# Patient Record
Sex: MALE | Race: WHITE | Employment: PART TIME | URBAN - METROPOLITAN AREA
[De-identification: names, ages, dates, MRNs, and addresses within clinical notes are randomized per-mention and may not be internally consistent; named-entity substitution may affect disease eponyms.]

---

## 2018-07-17 LAB — HBA1C MFR BLD: 5.3 % (ref 4–6)

## 2018-07-18 LAB
ANION GAP SERPL CALCULATED.3IONS-SCNC: 11 MMOL/L (ref 10–20)
BASOPHILS # BLD: 0.9 % (ref 0–1.3)
BASOPHILS ABSOLUTE: 0.07 10*3/UL (ref 0.01–0.09)
BICARBONATE: 26 MMOL/L (ref 21–32)
BUN / CREAT RATIO: 14 (ref 5–25)
CALCIUM SERPL-MCNC: 9 MG/DL (ref 8.6–10.3)
CHLORIDE BLD-SCNC: 106 MMOL/L (ref 98–107)
CHOLESTEROL/HDL RATIO: 3.8
CHOLESTEROL: 219 MG/DL
CREAT SERPL-MCNC: 0.93 MG/DL (ref 0.5–1.3)
EOSINOPHIL # BLD: 5 % (ref 0–6.7)
EOSINOPHILS ABSOLUTE: 0.38 10*3/UL (ref 0.01–0.46)
ERYTHROCYTE [DISTWIDTH] IN BLOOD BY AUTOMATED COUNT: 12.6 % (ref 12–15.4)
ERYTHROCYTE [DISTWIDTH] IN BLOOD BY AUTOMATED COUNT: 39.8 FL (ref 39.3–48.6)
GFR CALCULATED: >60
GLUCOSE: 92 MG/DL (ref 70–100)
HCT VFR BLD CALC: 40.2 % (ref 38.4–54.9)
HDLC SERPL-MCNC: 58 MG/DL
HEMOGLOBIN: 13.7 G/DL (ref 12.8–17.7)
IMMATURE GRANS (ABS): 0.02 10*3/UL (ref 0–0.21)
IMMATURE GRANULOCYTES: 0.3 %
LDL CHOLESTEROL CALCULATED: 145 MG/DL
LYMPHOCYTES # BLD: 23.9 % (ref 9.4–41.1)
LYMPHOCYTES ABSOLUTE: 1.81 10*3/UL (ref 0.4–2.84)
MCH RBC QN AUTO: 29.7 PG (ref 27.5–32.9)
MCHC RBC AUTO-ENTMCNC: 34.1 G/DL (ref 30.5–35.4)
MCV RBC AUTO: 87 FL (ref 83.3–98.2)
MONOCYTES # BLD: 6.2 % (ref 3–16.2)
MONOCYTES ABSOLUTE: 0.47 10*3/UL (ref 0.25–1.33)
NEUTROPHILS ABSOLUTE: 4.83 10*3/UL (ref 2.22–7.53)
NEUTROPHILS: 63.7 % (ref 46.2–79.1)
NUCLEATED RBCS: 0 /100{WBCS}
PLATELET # BLD: 200 10*3/UL (ref 155–404)
PMV BLD AUTO: 11.1 FL (ref 9.9–12.1)
POTASSIUM SERPL-SCNC: 3.8 MMOL/L (ref 3.5–5.1)
RBC: 4.62 10*6/UL (ref 4.08–6.37)
RBCS COUNTED: 0 10*3/UL
SODIUM BLD-SCNC: 139 MMOL/L (ref 136–145)
TRIGL SERPL-MCNC: 82 MG/DL
UREA NITROGEN: 13 MG/DL (ref 6–23)
VLDLC SERPL CALC-MCNC: 16 MG/DL
WBC: 7.6 10*3/UL (ref 4.2–11)

## 2018-11-12 ENCOUNTER — HOSPITAL ENCOUNTER (OUTPATIENT)
Dept: SLEEP CENTER | Age: 48
Discharge: HOME | End: 2018-11-14
Payer: COMMERCIAL

## 2018-11-12 PROCEDURE — 95810 POLYSOM 6/> YRS 4/> PARAM: CPT

## 2018-11-12 PROCEDURE — 95810 POLYSOM 6/> YRS 4/> PARAM: CPT | Performed by: INTERNAL MEDICINE

## 2019-02-15 ENCOUNTER — HOSPITAL ENCOUNTER (OUTPATIENT)
Dept: SLEEP CENTER | Age: 49
Discharge: HOME | End: 2019-02-17
Payer: COMMERCIAL

## 2019-02-15 PROCEDURE — 95811 POLYSOM 6/>YRS CPAP 4/> PARM: CPT

## 2019-02-15 PROCEDURE — 95811 POLYSOM 6/>YRS CPAP 4/> PARM: CPT | Performed by: INTERNAL MEDICINE

## 2019-03-29 ENCOUNTER — HOSPITAL ENCOUNTER (OUTPATIENT)
Dept: CARDIAC CATH/INVASIVE PROCEDURES | Age: 49
Discharge: HOME | End: 2019-03-29
Attending: INTERNAL MEDICINE | Admitting: INTERNAL MEDICINE
Payer: COMMERCIAL

## 2019-03-29 VITALS
TEMPERATURE: 97.4 F | WEIGHT: 214 LBS | SYSTOLIC BLOOD PRESSURE: 124 MMHG | BODY MASS INDEX: 28.99 KG/M2 | DIASTOLIC BLOOD PRESSURE: 78 MMHG | RESPIRATION RATE: 19 BRPM | HEIGHT: 72 IN | HEART RATE: 55 BPM

## 2019-03-29 PROBLEM — I63.9 STROKE (CEREBRUM) (HCC): Status: ACTIVE | Noted: 2019-03-29

## 2019-03-29 PROCEDURE — 93312 ECHO TRANSESOPHAGEAL: CPT

## 2019-03-29 PROCEDURE — 93325 DOPPLER ECHO COLOR FLOW MAPG: CPT

## 2019-03-29 PROCEDURE — 2580000003 HC RX 258: Performed by: INTERNAL MEDICINE

## 2019-03-29 PROCEDURE — 93321 DOPPLER ECHO F-UP/LMTD STD: CPT

## 2019-03-29 RX ORDER — FENTANYL CITRATE 50 UG/ML
100 INJECTION, SOLUTION INTRAMUSCULAR; INTRAVENOUS ONCE
Status: DISCONTINUED | OUTPATIENT
Start: 2019-03-29 | End: 2019-03-29 | Stop reason: HOSPADM

## 2019-03-29 RX ORDER — CLOPIDOGREL BISULFATE 75 MG/1
75 TABLET ORAL DAILY
COMMUNITY
End: 2021-04-27 | Stop reason: ALTCHOICE

## 2019-03-29 RX ORDER — SODIUM CHLORIDE 0.9 % (FLUSH) 0.9 %
10 SYRINGE (ML) INJECTION PRN
Status: DISCONTINUED | OUTPATIENT
Start: 2019-03-29 | End: 2019-03-29 | Stop reason: HOSPADM

## 2019-03-29 RX ORDER — CHOLECALCIFEROL (VITAMIN D3) 1250 MCG
50000 CAPSULE ORAL WEEKLY
COMMUNITY
End: 2021-04-27

## 2019-03-29 RX ORDER — MIDAZOLAM HYDROCHLORIDE 1 MG/ML
4 INJECTION INTRAMUSCULAR; INTRAVENOUS ONCE
Status: DISCONTINUED | OUTPATIENT
Start: 2019-03-29 | End: 2019-03-29 | Stop reason: HOSPADM

## 2019-03-29 RX ORDER — SERTRALINE HYDROCHLORIDE 25 MG/1
25 TABLET, FILM COATED ORAL DAILY
COMMUNITY
End: 2020-01-03 | Stop reason: ALTCHOICE

## 2019-03-29 RX ORDER — SODIUM CHLORIDE 9 MG/ML
INJECTION, SOLUTION INTRAVENOUS CONTINUOUS
Status: DISCONTINUED | OUTPATIENT
Start: 2019-03-29 | End: 2019-03-29 | Stop reason: HOSPADM

## 2019-03-29 RX ORDER — BACTERIOSTATIC SODIUM CHLORIDE 0.9 %
10 VIAL (ML) INJECTION ONCE
Status: DISCONTINUED | OUTPATIENT
Start: 2019-03-29 | End: 2019-03-29 | Stop reason: HOSPADM

## 2019-03-29 RX ORDER — FLUTICASONE FUROATE AND VILANTEROL 100; 25 UG/1; UG/1
1 POWDER RESPIRATORY (INHALATION) DAILY
COMMUNITY
End: 2020-01-03

## 2019-03-29 RX ORDER — ASPIRIN 81 MG/1
81 TABLET, CHEWABLE ORAL DAILY
Status: ON HOLD | COMMUNITY
End: 2020-02-24 | Stop reason: SDUPTHER

## 2019-03-29 RX ORDER — ALPRAZOLAM 0.5 MG/1
0.5 TABLET ORAL EVERY 4 HOURS PRN
COMMUNITY

## 2019-03-29 RX ADMIN — SODIUM CHLORIDE 1000 ML: 9 INJECTION, SOLUTION INTRAVENOUS at 07:59

## 2019-03-29 NOTE — OP NOTE
Clinical indication:  Transesophageal echocardiogram is being performed to look for cardioembolic source      Procedure:  Informed consent was obtained. Pros and cons of the procedure, indications, the very rare possibility of esophageal perforation and minor complications that are likely were discussed, informed consent was obtained. Throat was anesthetized using Cetacaine spray. Heart rate blood pressure oxygen saturation and respiratory status was monitored throughout. IV conscious sedation was maintained using Versed and Demerol. The Omniplane CARINA probe was advanced atraumatically into the distal esophagus and proximal stomach and pictures were obtained in multiple views. There were no immediate complications. 2-D Doppler and Color imaging revealed the following:    Left ventricular systolic function is overall normal, visually estimated left ventricular ejection fraction is about 55-60%   Left ventricular wall thickness is normal  No regional wall motion abnormality is apparent on transesophageal echocardiogram.  Left atrial size is measured at 4.3 cm, please correlate with surface study. Left atrium appears mildly dilated. Right atrium appears mildly dilated, please correlate with surface study. Right ventricular size and systolic function appear normal.      The aortic valve is tricuspid. Valve leaflets are normal, there is no aortic stenosis or regurgitation, aortic root size is normal..     The mitral valve is structurally normal, there is trivial mitral regurgitation. The tricuspid valve is structurally normal.  There is trivial tricuspid regurgitation. Be a systolic pressure could not be estimated because there was no appreciable tricuspid regurgitation. The left atrium and left atrial appendage are  well visualized.   Average left atrial   appendage velocity is 60-70 cm/s  There is no evidence of spontaneous echo contrast mass or thrombus in the left atrium or left atrial appendage. There is evidence of significant inter atrial shunting with injection of agitated saline contrast, this is consistent with patent foramen ovale. 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 There is no pericardial effusion. Limited views of the thoracic aorta show mild diffuse atherosclerosis. There were no immediate complications. Summary:    Grossly normal valves. Left atrium appears mildly dilated. Right atrium appears dilated. There is aneurysm of the inter atrial septum and the thickened inter atrial shunting, consistent with patent foramen ovale. Please correlate chamber dimensions with surface study. Recommendations:    Ongoing cardiology care will be directed by primary cardiologist Dr. nAny Max.  Neurology follow-up and recommendations per Dr. Susy Joyce.

## 2019-03-29 NOTE — PROGRESS NOTES
Patient has been resting in bed with family at the bedside. Gag reflex has returned and patient was offered food and fluid.

## 2020-01-03 ENCOUNTER — OFFICE VISIT (OUTPATIENT)
Dept: NEUROLOGY | Age: 50
End: 2020-01-03
Payer: COMMERCIAL

## 2020-01-03 VITALS
WEIGHT: 205 LBS | SYSTOLIC BLOOD PRESSURE: 115 MMHG | BODY MASS INDEX: 27.8 KG/M2 | RESPIRATION RATE: 12 BRPM | HEART RATE: 67 BPM | DIASTOLIC BLOOD PRESSURE: 80 MMHG

## 2020-01-03 DIAGNOSIS — I63.522 CEREBROVASCULAR ACCIDENT (CVA) DUE TO STENOSIS OF LEFT ANTERIOR CEREBRAL ARTERY (HCC): ICD-10-CM

## 2020-01-03 PROBLEM — Q21.12 PFO (PATENT FORAMEN OVALE): Status: ACTIVE | Noted: 2020-01-03

## 2020-01-03 LAB — HOMOCYSTEINE: 10.9 UMOL/L (ref 0–15)

## 2020-01-03 PROCEDURE — 99215 OFFICE O/P EST HI 40 MIN: CPT | Performed by: PSYCHIATRY & NEUROLOGY

## 2020-01-03 RX ORDER — ATORVASTATIN CALCIUM 20 MG/1
20 TABLET, FILM COATED ORAL DAILY
Status: ON HOLD | COMMUNITY
Start: 2019-12-24 | End: 2020-02-24 | Stop reason: SDUPTHER

## 2020-01-03 RX ORDER — ALBUTEROL SULFATE 90 UG/1
AEROSOL, METERED RESPIRATORY (INHALATION) EVERY 4 HOURS PRN
COMMUNITY
Start: 2019-01-29 | End: 2020-02-27

## 2020-01-03 ASSESSMENT — ENCOUNTER SYMPTOMS
VOMITING: 0
TROUBLE SWALLOWING: 0
CHOKING: 0
NAUSEA: 0
SHORTNESS OF BREATH: 0
PHOTOPHOBIA: 0
BACK PAIN: 0

## 2020-01-03 NOTE — PROGRESS NOTES
Subjective:      Patient ID: Liv Richardson is a 51 y.o. male who presents today for:  Chief Complaint   Patient presents with    Cerebrovascular Accident     Presets today for a follow up on a CVA. Patient states he is having issues with memory abd dizziness. CT of the brain is scheduled for 01/08/2020       HPI 61-year-old gentleman who I have not seen for almost a year. Patient had a stroke in July 2018 and was seen at [facility]. Patient was treated TPA but further investigation of the etiology of stroke was not done. Last seen had further referred him to Dr. Bettye Rodney for evaluation of his heart and he has not any follow-ups with us. He  does have a PFO with a thickening of the interatrial septum but no true aneurysm is noted continues on Plavix. He is also is on aspirin. Some obvious fallen through the cracks. Is no complaint of the symptoms except for some memory issues and anxiety which he takes low-dose Xanax for. He is discontinued all his other depressants as he did not feel well with this. He is no longer seeing Dr. Ruth Irvin. Patient did not obtain evaluations for hypercoagulable state either. Continues to have some memory issues which is short-term. No past medical history on file. No past surgical history on file.   Social History     Socioeconomic History    Marital status:      Spouse name: Not on file    Number of children: Not on file    Years of education: Not on file    Highest education level: Not on file   Occupational History    Not on file   Social Needs    Financial resource strain: Not on file    Food insecurity:     Worry: Not on file     Inability: Not on file    Transportation needs:     Medical: Not on file     Non-medical: Not on file   Tobacco Use    Smoking status: Current Every Day Smoker     Types: Cigars    Smokeless tobacco: Never Used   Substance and Sexual Activity    Alcohol use: Not Currently    Drug use: Not Currently    Sexual sounds. Musculoskeletal: Normal range of motion. Neurological:      Mental Status: He is alert and oriented to person, place, and time. Cranial Nerves: No cranial nerve deficit. Sensory: No sensory deficit. Motor: No abnormal muscle tone. Coordination: Coordination normal.      Deep Tendon Reflexes: Reflexes are normal and symmetric. Babinski sign absent on the right side. Babinski sign absent on the left side. No results found. No results found for: WBC, RBC, HGB, HCT, MCV, MCH, MCHC, RDW, PLT, MPV  No results found for: NA, K, CL, CO2, BUN, CREATININE, GFRAA, AGRATIO, LABGLOM, GLUCOSE, PROT, LABALBU, CALCIUM, BILITOT, ALKPHOS, AST, ALT  No results found for: PROTIME, INR  No results found for: TSH, IURPSAWB55, FOLATE, FERRITIN, IRON, TIBC, PTRFSAT, TSH, FREET4  No results found for: TRIG, HDL, LDLCALC, LDLDIRECT, LABVLDL  No results found for: LABAMPH, BARBSCNU, LABBENZ, CANNAB, COCAINESCRN, LABMETH, OPIATESCREENURINE, PHENCYCLIDINESCREENURINE, PPXUR, ETOH  No results found for: LITHIUM, DILFRTOT, VALPROATE    Assessment:       Diagnosis Orders   1. Cerebrovascular accident (CVA) due to stenosis of left anterior cerebral artery (HCC)  MRI Brain WO Contrast    Protein C Antigen, Total    Protein S Antigen, Total    Antiphospholipid Antibody Panel    Mthfr Mutation    Lupus Anticoagulant    Amb External Referral To Cardiology   2. PFO (patent foramen ovale)     Left cerebral stroke in the precentral gyrus. The patient had improved considerably with TPA when I seen him. He actually has not had a work-up done and therefore we are further recommended a transesophageal echo. He truly does have a PFO but he is fell through the cracks of follow-ups and has not been seen here for a while. He continues on aspirin and Plavix. Patient does have a follow-up with cardiology.   I further recommended that he be seen by Dr. Susana Nicole to see if this PFO closure can be done given his age and already had a stroke. Pleat hypercoagulable work-up will be recommended. recommended an MRI of the brain to see his any other silent infarcts. Event of memory issues somewhat difficult situation given he has anxiety and continue on Xanax. He discontinued all his antidepressants due to side effects and is feeling much better. He is no longer followed by psychiatry. Is an extended evaluation. Plan:      Orders Placed This Encounter   Procedures    MRI Brain WO Contrast     Standing Status:   Future     Standing Expiration Date:   1/3/2021     Order Specific Question:   Reason for exam:     Answer:   stroke    Protein C Antigen, Total     Standing Status:   Future     Number of Occurrences:   1     Standing Expiration Date:   1/3/2021    Protein S Antigen, Total     Standing Status:   Future     Number of Occurrences:   1     Standing Expiration Date:   1/3/2021    Antiphospholipid Antibody Panel     Standing Status:   Future     Number of Occurrences:   1     Standing Expiration Date:   1/3/2021    Mthfr Mutation     Standing Status:   Future     Number of Occurrences:   1     Standing Expiration Date:   1/3/2021     Order Specific Question:   MTHFR PCR Specimen     Answer:   stroke    Lupus Anticoagulant     Standing Status:   Future     Number of Occurrences:   1     Standing Expiration Date:   1/3/2021    Amb External Referral To Cardiology     Referral Priority:   Routine     Referral Type:   Eval and Treat     Referred to Provider:   Sonja Lamar MD     Requested Specialty:   Cardiology     Number of Visits Requested:   1     No orders of the defined types were placed in this encounter. Return in about 6 weeks (around 2/14/2020).       Gera King MD

## 2020-01-05 LAB
PROTEIN C ANTIGEN: >95 % (ref 63–153)
PROTEIN S ANTIGEN, TOTAL: 119 % (ref 84–134)

## 2020-01-06 LAB
DRVVT CONFIRMATION TEST: NORMAL RATIO
DRVVT SCREEN: 39 SEC (ref 33–44)
DRVVT,DIL: NORMAL SEC (ref 33–44)
HEXAGONAL PHOSPHOLIPID NEUTRALIZAT TEST: NORMAL
LUPUS ANTICOAG INTERP: NORMAL
PLT NEUTA: NORMAL
PT D: 12.2 SEC (ref 12–15.5)
PTT D: 41 SEC (ref 32–48)
PTT-D CORR REFLEX: NORMAL SEC (ref 32–48)
PTT-HEPARIN NEUTRALIZED: NORMAL SEC (ref 32–48)
REPTILASE TIME: NORMAL SEC
THROMBIN TIME: NORMAL SEC (ref 14.7–19.5)

## 2020-01-07 LAB
ANTIPHOSPHOLIPID AB IGG: 0 GPL (ref 0–14)
ANTIPHOSPHOLIPID AB IGM: 4 MPL (ref 0–14)

## 2020-01-14 ENCOUNTER — TELEPHONE (OUTPATIENT)
Dept: NEUROLOGY | Age: 50
End: 2020-01-14

## 2020-02-11 ENCOUNTER — TELEPHONE (OUTPATIENT)
Dept: CARDIOLOGY CLINIC | Age: 50
End: 2020-02-11

## 2020-02-11 ENCOUNTER — OFFICE VISIT (OUTPATIENT)
Dept: CARDIOLOGY CLINIC | Age: 50
End: 2020-02-11
Payer: COMMERCIAL

## 2020-02-11 VITALS
BODY MASS INDEX: 28.92 KG/M2 | HEART RATE: 60 BPM | WEIGHT: 213.2 LBS | DIASTOLIC BLOOD PRESSURE: 80 MMHG | SYSTOLIC BLOOD PRESSURE: 120 MMHG

## 2020-02-11 PROCEDURE — 99204 OFFICE O/P NEW MOD 45 MIN: CPT | Performed by: INTERNAL MEDICINE

## 2020-02-11 PROCEDURE — 93000 ELECTROCARDIOGRAM COMPLETE: CPT | Performed by: INTERNAL MEDICINE

## 2020-02-11 NOTE — PROGRESS NOTES
Chief Complaint   Patient presents with   [facility] Cardiologist     PREVIOUS [facility] PATIENT  SAW DR Kathy Rowell       2-11-20: Patient presents for initial medical evaluation. Patient is followed on a regular basis by Dr. Mikayla Martins MD. Following with dr. Alyssa Dias for hx of CVA in 7/18 requiring TPA. 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 Was in process of obtaining PFO closure as well as LHC with Dr. Carolina Osuna, but was referred to me due to insurance reasons. He was having CP with radiation to left arm as well as significant SOB and worse with exertion. Underwent a stress test. Does have hx of dyslpidemia, family hx of CAD, smoker. Has moderate carotid disease. Pt denies ,   nausea, vomiting, diarrhea, constipation, insomnia, weight loss, syncope, dizziness, lightheadedness, palpitations, PND, orthopnea, or claudication. No hx of MI, CHF or arrhythmia. S/p one month event monitor which was negative. He is on ASA/plavix at this time. Patient Active Problem List   Diagnosis    WOOD (obstructive sleep apnea)    Stroke (cerebrum) (HCC)    PFO (patent foramen ovale)       No past surgical history on file.     Social History     Socioeconomic History    Marital status:      Spouse name: Not on file    Number of children: Not on file    Years of education: Not on file    Highest education level: Not on file   Occupational History    Not on file   Social Needs    Financial resource strain: Not on file    Food insecurity:     Worry: Not on file     Inability: Not on file    Transportation needs:     Medical: Not on file     Non-medical: Not on file   Tobacco Use    Smoking status: Current Every Day Smoker     Types: Cigars    Smokeless tobacco: Never Used   Substance and Sexual Activity    Alcohol use: Not Currently    Drug use: Not Currently    Sexual activity: Yes   Lifestyle    Physical activity:     Days per week: Not on file     Minutes per session: Not on file    Stress: Not on file in no distress and overweight  Mental status - alert, oriented to person, place, and time  Neck - Neck is supple, no JVD or carotid bruits. No thyromegaly or adenopathy. Chest - clear to auscultation, no wheezes, rales or rhonchi, symmetric air entry  Heart - normal rate, regular rhythm, normal S1, S2, no murmurs, rubs, clicks or gallops  Abdomen - soft, nontender, nondistended, no masses or organomegaly  Neurological - alert, oriented, normal speech, no focal findings or movement disorder noted  Extremities - peripheral pulses normal, no pedal edema, no clubbing or cyanosis  Skin - normal coloration and turgor, no rashes, no suspicious skin lesions noted      EKG: normal sinus rhythm, nonspecific ST and T waves changes    Orders Placed This Encounter   Procedures    EKG 12 Lead       ASSESSMENT:     Diagnosis Orders   1. PE (physical exam), routine  EKG 12 Lead   2. PFO (patent foramen ovale)  LEFT HEART CATH   3. Precordial pain  LEFT HEART CATH         PLAN:     Patient will need to continue to follow up with you for their general medical care     As always, aggressive risk factor modification is strongly recommended. We should adhere to the JNC VIII guidelines for HTN management and the NCEP ATP III guidelines for LDL-C management. Cardiac diet is always recommended with low fat, cholesterol, calories and sodium. Continue medications at current doses. Had a long talk with the patient regarding their symptoms, test results and the different treatment options available which include cardiac cath, alternate imaging modality and medical therapy. Patient would like to proceed with cardiac catheterization and understands the risks and benefits of the procedure. To be performed at [facility].    Will arrange for PFO closure as well given hx of CVA requiring TPA, IAS aneurysm and significant right to left PFO.    Would've liked to perform this procedure at [facility] where his insurance is accepted

## 2020-02-13 NOTE — TELEPHONE ENCOUNTER
THIS PATIENT HAS AN APPEAL IN WITH [facility] [employer] TO DO PFO CLOSURE AT [address] [place] [address]. PER DR. HOLIDAY.   AWAITING RESPONSE

## 2020-02-18 ENCOUNTER — TELEPHONE (OUTPATIENT)
Dept: CARDIOLOGY CLINIC | Age: 50
End: 2020-02-18

## 2020-02-18 NOTE — TELEPHONE ENCOUNTER
PATIENT NEEDS TO HOW HE'LL NEED TO OFF OF WORK FOR THE FUTURE PROCEDURES   Monday FEB 24TH- HEART CATH  MARCH 5TH- PFO CLOSURE.   PLEASE ADVISE

## 2020-02-24 ENCOUNTER — HOSPITAL ENCOUNTER (OUTPATIENT)
Dept: CARDIAC CATH/INVASIVE PROCEDURES | Age: 50
Discharge: HOME | End: 2020-02-24
Attending: INTERNAL MEDICINE | Admitting: INTERNAL MEDICINE
Payer: COMMERCIAL

## 2020-02-24 VITALS
HEIGHT: 72 IN | TEMPERATURE: 98.7 F | OXYGEN SATURATION: 99 % | HEART RATE: 45 BPM | RESPIRATION RATE: 16 BRPM | DIASTOLIC BLOOD PRESSURE: 73 MMHG | WEIGHT: 210 LBS | BODY MASS INDEX: 28.44 KG/M2 | SYSTOLIC BLOOD PRESSURE: 110 MMHG

## 2020-02-24 LAB
ANION GAP SERPL CALCULATED.3IONS-SCNC: 14 MEQ/L (ref 9–15)
BUN BLDV-MCNC: 18 MG/DL (ref 6–20)
CALCIUM SERPL-MCNC: 9.1 MG/DL (ref 8.5–9.9)
CHLORIDE BLD-SCNC: 104 MEQ/L (ref 95–107)
CO2: 23 MEQ/L (ref 20–31)
CREAT SERPL-MCNC: 0.93 MG/DL (ref 0.7–1.2)
GFR AFRICAN AMERICAN: >60
GFR NON-AFRICAN AMERICAN: >60
GLUCOSE BLD-MCNC: 91 MG/DL (ref 70–99)
HCT VFR BLD CALC: 42.2 % (ref 42–52)
HEMOGLOBIN: 14.1 G/DL (ref 14–18)
LV EF: 65 %
LVEF MODALITY: NORMAL
MCH RBC QN AUTO: 28.6 PG (ref 27–31.3)
MCHC RBC AUTO-ENTMCNC: 33.4 % (ref 33–37)
MCV RBC AUTO: 85.8 FL (ref 80–100)
PDW BLD-RTO: 13.5 % (ref 11.5–14.5)
PLATELET # BLD: 229 K/UL (ref 130–400)
POTASSIUM SERPL-SCNC: 4 MEQ/L (ref 3.4–4.9)
RBC # BLD: 4.92 M/UL (ref 4.7–6.1)
SODIUM BLD-SCNC: 141 MEQ/L (ref 135–144)
WBC # BLD: 6.2 K/UL (ref 4.8–10.8)

## 2020-02-24 PROCEDURE — 85347 COAGULATION TIME ACTIVATED: CPT

## 2020-02-24 PROCEDURE — 93458 L HRT ARTERY/VENTRICLE ANGIO: CPT | Performed by: INTERNAL MEDICINE

## 2020-02-24 PROCEDURE — 80048 BASIC METABOLIC PNL TOTAL CA: CPT

## 2020-02-24 PROCEDURE — 2580000003 HC RX 258

## 2020-02-24 PROCEDURE — 6370000000 HC RX 637 (ALT 250 FOR IP): Performed by: INTERNAL MEDICINE

## 2020-02-24 PROCEDURE — 92928 PRQ TCAT PLMT NTRAC ST 1 LES: CPT | Performed by: INTERNAL MEDICINE

## 2020-02-24 PROCEDURE — C1874 STENT, COATED/COV W/DEL SYS: HCPCS

## 2020-02-24 PROCEDURE — 6360000004 HC RX CONTRAST MEDICATION: Performed by: INTERNAL MEDICINE

## 2020-02-24 PROCEDURE — C1725 CATH, TRANSLUMIN NON-LASER: HCPCS

## 2020-02-24 PROCEDURE — 2500000003 HC RX 250 WO HCPCS

## 2020-02-24 PROCEDURE — 6360000002 HC RX W HCPCS

## 2020-02-24 PROCEDURE — C1769 GUIDE WIRE: HCPCS

## 2020-02-24 PROCEDURE — 2709999900 HC NON-CHARGEABLE SUPPLY

## 2020-02-24 PROCEDURE — C1894 INTRO/SHEATH, NON-LASER: HCPCS

## 2020-02-24 PROCEDURE — 6370000000 HC RX 637 (ALT 250 FOR IP)

## 2020-02-24 PROCEDURE — 85027 COMPLETE CBC AUTOMATED: CPT

## 2020-02-24 PROCEDURE — C1887 CATHETER, GUIDING: HCPCS

## 2020-02-24 RX ORDER — ACETAMINOPHEN 325 MG/1
650 TABLET ORAL EVERY 4 HOURS PRN
Status: DISCONTINUED | OUTPATIENT
Start: 2020-02-24 | End: 2020-02-24 | Stop reason: HOSPADM

## 2020-02-24 RX ORDER — ONDANSETRON 2 MG/ML
4 INJECTION INTRAMUSCULAR; INTRAVENOUS EVERY 6 HOURS PRN
Status: DISCONTINUED | OUTPATIENT
Start: 2020-02-24 | End: 2020-02-24 | Stop reason: HOSPADM

## 2020-02-24 RX ORDER — SODIUM CHLORIDE 9 MG/ML
INJECTION, SOLUTION INTRAVENOUS CONTINUOUS
Status: ACTIVE | OUTPATIENT
Start: 2020-02-24 | End: 2020-02-24

## 2020-02-24 RX ORDER — ATENOLOL 25 MG/1
25 TABLET ORAL DAILY
Qty: 30 TABLET | Refills: 3 | Status: ON HOLD | OUTPATIENT
Start: 2020-02-24 | End: 2020-02-28 | Stop reason: HOSPADM

## 2020-02-24 RX ORDER — ATORVASTATIN CALCIUM 40 MG/1
40 TABLET, FILM COATED ORAL DAILY
Qty: 30 TABLET | Refills: 6 | Status: SHIPPED | OUTPATIENT
Start: 2020-02-24 | End: 2020-09-23

## 2020-02-24 RX ORDER — SODIUM CHLORIDE 9 MG/ML
INJECTION, SOLUTION INTRAVENOUS CONTINUOUS
Status: DISCONTINUED | OUTPATIENT
Start: 2020-02-24 | End: 2020-02-24 | Stop reason: HOSPADM

## 2020-02-24 RX ORDER — ATORVASTATIN CALCIUM 40 MG/1
40 TABLET, FILM COATED ORAL DAILY
Qty: 30 TABLET | Refills: 6 | Status: SHIPPED | OUTPATIENT
Start: 2020-02-24 | End: 2020-02-24 | Stop reason: SDUPTHER

## 2020-02-24 RX ORDER — SODIUM CHLORIDE 0.9 % (FLUSH) 0.9 %
10 SYRINGE (ML) INJECTION EVERY 12 HOURS SCHEDULED
Status: DISCONTINUED | OUTPATIENT
Start: 2020-02-24 | End: 2020-02-24 | Stop reason: HOSPADM

## 2020-02-24 RX ORDER — SODIUM CHLORIDE 0.9 % (FLUSH) 0.9 %
10 SYRINGE (ML) INJECTION PRN
Status: DISCONTINUED | OUTPATIENT
Start: 2020-02-24 | End: 2020-02-24 | Stop reason: HOSPADM

## 2020-02-24 RX ORDER — LABETALOL 20 MG/4 ML (5 MG/ML) INTRAVENOUS SYRINGE
10 EVERY 30 MIN PRN
Status: DISCONTINUED | OUTPATIENT
Start: 2020-02-24 | End: 2020-02-24 | Stop reason: HOSPADM

## 2020-02-24 RX ORDER — LISINOPRIL 5 MG/1
5 TABLET ORAL DAILY
Qty: 30 TABLET | Refills: 5 | Status: ON HOLD | OUTPATIENT
Start: 2020-02-24 | End: 2020-02-28 | Stop reason: HOSPADM

## 2020-02-24 RX ORDER — ASPIRIN 81 MG/1
243 TABLET, CHEWABLE ORAL ONCE
Status: COMPLETED | OUTPATIENT
Start: 2020-02-24 | End: 2020-02-24

## 2020-02-24 RX ORDER — ASPIRIN 81 MG/1
325 TABLET, CHEWABLE ORAL DAILY
Qty: 30 TABLET | Refills: 3 | Status: SHIPPED | OUTPATIENT
Start: 2020-02-24 | End: 2020-10-22 | Stop reason: DRUGHIGH

## 2020-02-24 RX ORDER — HYDRALAZINE HYDROCHLORIDE 20 MG/ML
10 INJECTION INTRAMUSCULAR; INTRAVENOUS EVERY 10 MIN PRN
Status: DISCONTINUED | OUTPATIENT
Start: 2020-02-24 | End: 2020-02-24 | Stop reason: HOSPADM

## 2020-02-24 RX ORDER — ALPRAZOLAM 0.5 MG/1
0.5 TABLET ORAL
Status: DISCONTINUED | OUTPATIENT
Start: 2020-02-24 | End: 2020-02-24 | Stop reason: HOSPADM

## 2020-02-24 RX ORDER — LISINOPRIL 5 MG/1
5 TABLET ORAL DAILY
Qty: 30 TABLET | Refills: 5 | Status: SHIPPED | OUTPATIENT
Start: 2020-02-24 | End: 2020-02-24 | Stop reason: SDUPTHER

## 2020-02-24 RX ADMIN — IOPAMIDOL 115 ML: 612 INJECTION, SOLUTION INTRAVENOUS at 10:17

## 2020-02-24 RX ADMIN — ASPIRIN 81 MG 243 MG: 81 TABLET ORAL at 10:47

## 2020-02-24 NOTE — BRIEF OP NOTE
Section of Cardiology  Adult Brief Cardiac Cath Procedure Note        Procedure(s):  LHC, b/l coronary angio, PCI of LAD with RADHIKA    Pre-operative Diagnosis:  angina    H&P Status: Completed and reviewed.      Post-operative Diagnosis:  80% mid LAD otherwise mild CAD, normal LVF  Dilated aortic root     Findings:  See full report    Complications:  none    Primary Proceduralist:   Dr.Wes Canseco DO    Plan    DAPT      Full procedure note to follow

## 2020-02-24 NOTE — CONSULTS
Cardiac Rehab info given and discussed with patient and wife. He would like to start after surgery for PFO.

## 2020-02-24 NOTE — PROGRESS NOTES
Pt returned to pre/post from lab with right band in place, no signs of bleeding or hematoma. Skin warm and dry. Pt awake, follows commands. HR rosario 50, pt states he normally has low HR.

## 2020-02-25 LAB
PERFORMED ON: ABNORMAL
POC ACTIVATED CLOTTING TIME KAOLIN: 285 SEC (ref 82–152)
POC SAMPLE TYPE: ABNORMAL

## 2020-02-27 ENCOUNTER — APPOINTMENT (OUTPATIENT)
Dept: GENERAL RADIOLOGY | Age: 50
End: 2020-02-27
Payer: COMMERCIAL

## 2020-02-27 ENCOUNTER — TELEPHONE (OUTPATIENT)
Dept: CARDIOLOGY CLINIC | Age: 50
End: 2020-02-27

## 2020-02-27 ENCOUNTER — HOSPITAL ENCOUNTER (OUTPATIENT)
Age: 50
Setting detail: OBSERVATION
Discharge: HOME | End: 2020-02-28
Attending: EMERGENCY MEDICINE | Admitting: INTERNAL MEDICINE
Payer: COMMERCIAL

## 2020-02-27 PROBLEM — R07.9 CHEST PAIN: Status: ACTIVE | Noted: 2020-02-27

## 2020-02-27 LAB
ALBUMIN SERPL-MCNC: 4.1 G/DL (ref 3.5–4.6)
ALP BLD-CCNC: 66 U/L (ref 35–104)
ALT SERPL-CCNC: 22 U/L (ref 0–41)
ANION GAP SERPL CALCULATED.3IONS-SCNC: 14 MEQ/L (ref 9–15)
AST SERPL-CCNC: 21 U/L (ref 0–40)
BASOPHILS ABSOLUTE: 0.1 K/UL (ref 0–0.2)
BASOPHILS RELATIVE PERCENT: 0.9 %
BILIRUB SERPL-MCNC: 0.3 MG/DL (ref 0.2–0.7)
BUN BLDV-MCNC: 16 MG/DL (ref 6–20)
CALCIUM SERPL-MCNC: 9.3 MG/DL (ref 8.5–9.9)
CHLORIDE BLD-SCNC: 103 MEQ/L (ref 95–107)
CO2: 23 MEQ/L (ref 20–31)
CREAT SERPL-MCNC: 1.03 MG/DL (ref 0.7–1.2)
D DIMER: 0.46 MG/L FEU (ref 0–0.5)
EOSINOPHILS ABSOLUTE: 0.2 K/UL (ref 0–0.7)
EOSINOPHILS RELATIVE PERCENT: 2.7 %
GFR AFRICAN AMERICAN: >60
GFR NON-AFRICAN AMERICAN: >60
GLOBULIN: 2.9 G/DL (ref 2.3–3.5)
GLUCOSE BLD-MCNC: 115 MG/DL (ref 70–99)
HCT VFR BLD CALC: 41.4 % (ref 42–52)
HEMOGLOBIN: 14.2 G/DL (ref 14–18)
LYMPHOCYTES ABSOLUTE: 1.4 K/UL (ref 1–4.8)
LYMPHOCYTES RELATIVE PERCENT: 18.1 %
MAGNESIUM: 2.1 MG/DL (ref 1.7–2.4)
MCH RBC QN AUTO: 29.3 PG (ref 27–31.3)
MCHC RBC AUTO-ENTMCNC: 34.2 % (ref 33–37)
MCV RBC AUTO: 85.6 FL (ref 80–100)
MONOCYTES ABSOLUTE: 0.4 K/UL (ref 0.2–0.8)
MONOCYTES RELATIVE PERCENT: 5.6 %
NEUTROPHILS ABSOLUTE: 5.4 K/UL (ref 1.4–6.5)
NEUTROPHILS RELATIVE PERCENT: 72.7 %
PDW BLD-RTO: 13.3 % (ref 11.5–14.5)
PLATELET # BLD: 210 K/UL (ref 130–400)
POTASSIUM SERPL-SCNC: 3.7 MEQ/L (ref 3.4–4.9)
RBC # BLD: 4.84 M/UL (ref 4.7–6.1)
SODIUM BLD-SCNC: 140 MEQ/L (ref 135–144)
TOTAL PROTEIN: 7 G/DL (ref 6.3–8)
TROPONIN: 0.01 NG/ML (ref 0–0.01)
WBC # BLD: 7.5 K/UL (ref 4.8–10.8)

## 2020-02-27 PROCEDURE — 36415 COLL VENOUS BLD VENIPUNCTURE: CPT

## 2020-02-27 PROCEDURE — 71046 X-RAY EXAM CHEST 2 VIEWS: CPT

## 2020-02-27 PROCEDURE — 93005 ELECTROCARDIOGRAM TRACING: CPT | Performed by: EMERGENCY MEDICINE

## 2020-02-27 PROCEDURE — 83735 ASSAY OF MAGNESIUM: CPT

## 2020-02-27 PROCEDURE — 80053 COMPREHEN METABOLIC PANEL: CPT

## 2020-02-27 PROCEDURE — 99285 EMERGENCY DEPT VISIT HI MDM: CPT

## 2020-02-27 PROCEDURE — 2580000003 HC RX 258: Performed by: INTERNAL MEDICINE

## 2020-02-27 PROCEDURE — 85379 FIBRIN DEGRADATION QUANT: CPT

## 2020-02-27 PROCEDURE — 96372 THER/PROPH/DIAG INJ SC/IM: CPT

## 2020-02-27 PROCEDURE — 6370000000 HC RX 637 (ALT 250 FOR IP): Performed by: INTERNAL MEDICINE

## 2020-02-27 PROCEDURE — 85025 COMPLETE CBC W/AUTO DIFF WBC: CPT

## 2020-02-27 PROCEDURE — 84484 ASSAY OF TROPONIN QUANT: CPT

## 2020-02-27 PROCEDURE — 94664 DEMO&/EVAL PT USE INHALER: CPT

## 2020-02-27 PROCEDURE — 2580000003 HC RX 258: Performed by: EMERGENCY MEDICINE

## 2020-02-27 PROCEDURE — G0378 HOSPITAL OBSERVATION PER HR: HCPCS

## 2020-02-27 PROCEDURE — 6360000002 HC RX W HCPCS: Performed by: INTERNAL MEDICINE

## 2020-02-27 RX ORDER — NITROGLYCERIN 0.4 MG/1
0.4 TABLET SUBLINGUAL EVERY 5 MIN PRN
Status: DISCONTINUED | OUTPATIENT
Start: 2020-02-27 | End: 2020-02-28 | Stop reason: HOSPADM

## 2020-02-27 RX ORDER — LISINOPRIL 5 MG/1
5 TABLET ORAL DAILY
Status: DISCONTINUED | OUTPATIENT
Start: 2020-02-27 | End: 2020-02-28 | Stop reason: HOSPADM

## 2020-02-27 RX ORDER — ATENOLOL 25 MG/1
25 TABLET ORAL DAILY
Status: DISCONTINUED | OUTPATIENT
Start: 2020-02-27 | End: 2020-02-28 | Stop reason: HOSPADM

## 2020-02-27 RX ORDER — SODIUM CHLORIDE 0.9 % (FLUSH) 0.9 %
10 SYRINGE (ML) INJECTION EVERY 12 HOURS SCHEDULED
Status: DISCONTINUED | OUTPATIENT
Start: 2020-02-27 | End: 2020-02-28 | Stop reason: HOSPADM

## 2020-02-27 RX ORDER — 0.9 % SODIUM CHLORIDE 0.9 %
1000 INTRAVENOUS SOLUTION INTRAVENOUS ONCE
Status: COMPLETED | OUTPATIENT
Start: 2020-02-27 | End: 2020-02-27

## 2020-02-27 RX ORDER — ASPIRIN 81 MG/1
325 TABLET, CHEWABLE ORAL DAILY
Status: DISCONTINUED | OUTPATIENT
Start: 2020-02-28 | End: 2020-02-28 | Stop reason: HOSPADM

## 2020-02-27 RX ORDER — CLOPIDOGREL BISULFATE 75 MG/1
75 TABLET ORAL DAILY
Status: DISCONTINUED | OUTPATIENT
Start: 2020-02-27 | End: 2020-02-28 | Stop reason: HOSPADM

## 2020-02-27 RX ORDER — ASPIRIN 81 MG/1
324 TABLET, CHEWABLE ORAL ONCE
Status: DISCONTINUED | OUTPATIENT
Start: 2020-02-27 | End: 2020-02-27 | Stop reason: SDUPTHER

## 2020-02-27 RX ORDER — ALBUTEROL SULFATE 2.5 MG/3ML
2.5 SOLUTION RESPIRATORY (INHALATION) EVERY 6 HOURS PRN
Status: DISCONTINUED | OUTPATIENT
Start: 2020-02-27 | End: 2020-02-28 | Stop reason: HOSPADM

## 2020-02-27 RX ORDER — ACETAMINOPHEN 325 MG/1
650 TABLET ORAL EVERY 4 HOURS PRN
Status: DISCONTINUED | OUTPATIENT
Start: 2020-02-27 | End: 2020-02-28 | Stop reason: HOSPADM

## 2020-02-27 RX ORDER — ONDANSETRON 2 MG/ML
4 INJECTION INTRAMUSCULAR; INTRAVENOUS EVERY 6 HOURS PRN
Status: DISCONTINUED | OUTPATIENT
Start: 2020-02-27 | End: 2020-02-28 | Stop reason: HOSPADM

## 2020-02-27 RX ORDER — ERGOCALCIFEROL 1.25 MG/1
50000 CAPSULE ORAL WEEKLY
Status: DISCONTINUED | OUTPATIENT
Start: 2020-02-27 | End: 2020-02-28 | Stop reason: HOSPADM

## 2020-02-27 RX ORDER — ALBUTEROL SULFATE 90 UG/1
AEROSOL, METERED RESPIRATORY (INHALATION)
COMMUNITY
Start: 2019-01-29

## 2020-02-27 RX ORDER — SODIUM CHLORIDE 0.9 % (FLUSH) 0.9 %
10 SYRINGE (ML) INJECTION PRN
Status: DISCONTINUED | OUTPATIENT
Start: 2020-02-27 | End: 2020-02-28 | Stop reason: HOSPADM

## 2020-02-27 RX ORDER — MORPHINE SULFATE 4 MG/ML
4 INJECTION, SOLUTION INTRAMUSCULAR; INTRAVENOUS EVERY 4 HOURS PRN
Status: DISCONTINUED | OUTPATIENT
Start: 2020-02-27 | End: 2020-02-28 | Stop reason: HOSPADM

## 2020-02-27 RX ORDER — ATORVASTATIN CALCIUM 40 MG/1
40 TABLET, FILM COATED ORAL DAILY
Status: DISCONTINUED | OUTPATIENT
Start: 2020-02-27 | End: 2020-02-28 | Stop reason: HOSPADM

## 2020-02-27 RX ORDER — ALPRAZOLAM 0.5 MG/1
0.5 TABLET ORAL EVERY 4 HOURS PRN
Status: DISCONTINUED | OUTPATIENT
Start: 2020-02-27 | End: 2020-02-28 | Stop reason: HOSPADM

## 2020-02-27 RX ADMIN — SODIUM CHLORIDE 1000 ML: 9 INJECTION, SOLUTION INTRAVENOUS at 16:01

## 2020-02-27 RX ADMIN — ATORVASTATIN CALCIUM 40 MG: 40 TABLET, FILM COATED ORAL at 20:13

## 2020-02-27 RX ADMIN — Medication 10 ML: at 22:23

## 2020-02-27 RX ADMIN — ENOXAPARIN SODIUM 40 MG: 40 INJECTION SUBCUTANEOUS at 19:00

## 2020-02-27 ASSESSMENT — PAIN SCALES - GENERAL
PAINLEVEL_OUTOF10: 3
PAINLEVEL_OUTOF10: 5

## 2020-02-27 ASSESSMENT — PAIN DESCRIPTION - PAIN TYPE: TYPE: ACUTE PAIN

## 2020-02-27 ASSESSMENT — PAIN DESCRIPTION - DESCRIPTORS: DESCRIPTORS: ACHING;DULL

## 2020-02-27 ASSESSMENT — ENCOUNTER SYMPTOMS
SHORTNESS OF BREATH: 0
COUGH: 0
NAUSEA: 0
SORE THROAT: 0
DIARRHEA: 0
VOMITING: 0
BACK PAIN: 0
ABDOMINAL PAIN: 0

## 2020-02-27 ASSESSMENT — PAIN DESCRIPTION - ORIENTATION: ORIENTATION: LEFT

## 2020-02-27 ASSESSMENT — PAIN DESCRIPTION - FREQUENCY: FREQUENCY: INTERMITTENT

## 2020-02-27 ASSESSMENT — PAIN DESCRIPTION - PROGRESSION
CLINICAL_PROGRESSION: NOT CHANGED
CLINICAL_PROGRESSION: GRADUALLY IMPROVING

## 2020-02-27 ASSESSMENT — PAIN DESCRIPTION - LOCATION: LOCATION: CHEST

## 2020-02-27 ASSESSMENT — PAIN DESCRIPTION - ONSET: ONSET: ON-GOING

## 2020-02-27 NOTE — ED NOTES
Blood specimens labeled with yellow patient stickers and sent to lab     [facility] [address], RN  02/27/20 9651

## 2020-02-27 NOTE — FLOWSHEET NOTE
Patient arrived to floor per W/C. Denies C/P at this time. Admission complete. Dr. Laron Allison called. Orders received.

## 2020-02-27 NOTE — ED TRIAGE NOTES
Pt to ed from home via triage with c/o cp onset 3 hrs pta.      PT is s/p stent on 2/25, reports pain has \"come and gone\" since cath but pt states this is not getting better

## 2020-02-27 NOTE — ED PROVIDER NOTES
[address] [facility] ED  eMERGENCYdEPARTMENT eNCOUnter      Pt Name: Chalino Bo  MRN: [id number]  Jaceytrongfradha 1970  Date of evaluation: 2/27/2020  Tanmay Camacho MD    CHIEF COMPLAINT           HPI  Chalino Bo is a 51 y.o. male per chart review has a h/o CAD s/p PCI, WOOD, PFO presents to the ED with chest pain, dizziness. Pt notes gradual onset, moderate, constant, L sided chest tightness since 1 pm. +Dizziness. Pt denies fever, n/v, sob, dysuria, diarrhea. Pt had a stent placed in his RCA 2/24. ROS  Review of Systems   Constitutional: Negative for activity change, chills and fever. HENT: Negative for ear pain and sore throat. Eyes: Negative for visual disturbance. Respiratory: Negative for cough and shortness of breath. Cardiovascular: Positive for chest pain. Negative for palpitations and leg swelling. Gastrointestinal: Negative for abdominal pain, diarrhea, nausea and vomiting. Genitourinary: Negative for dysuria. Musculoskeletal: Negative for back pain. Skin: Negative for rash. Neurological: Positive for dizziness. Negative for weakness. Except as noted above the remainder of the review of systems was reviewed and negative. PAST MEDICAL HISTORY   No past medical history on file. SURGICAL HISTORY     No past surgical history on file. CURRENTMEDICATIONS       Previous Medications    ALBUTEROL SULFATE  (90 BASE) MCG/ACT INHALER    Inhale into the lungs every 4 hours as needed     ALPRAZOLAM (XANAX) 0.5 MG TABLET    Take 0.5 mg by mouth every 4 hours as needed for Sleep.     ASPIRIN 81 MG CHEWABLE TABLET    Take 4 tablets by mouth daily    ATENOLOL (TENORMIN) 25 MG TABLET    Take 1 tablet by mouth daily    ATORVASTATIN (LIPITOR) 40 MG TABLET    Take 1 tablet by mouth daily    CHOLECALCIFEROL (VITAMIN D3) 78840 UNITS CAPS    Take 50,000 Units by mouth once a week    CLOPIDOGREL (PLAVIX) 75 MG TABLET    Take 75 mg by mouth daily    LISINOPRIL (PRINIVIL;ZESTRIL) 5 MG TABLET    Take 1 tablet by mouth daily       ALLERGIES     Patient has no known allergies. FAMILY HISTORY     No family history on file. SOCIAL HISTORY       Social History     Socioeconomic History    Marital status:      Spouse name: Not on file    Number of children: Not on file    Years of education: Not on file    Highest education level: Not on file   Occupational History    Not on file   Social Needs    Financial resource strain: Not on file    Food insecurity:     Worry: Not on file     Inability: Not on file    Transportation needs:     Medical: Not on file     Non-medical: Not on file   Tobacco Use    Smoking status: Current Every Day Smoker     Types: Cigars    Smokeless tobacco: Never Used   Substance and Sexual Activity    Alcohol use: Not Currently    Drug use: Not Currently    Sexual activity: Yes   Lifestyle    Physical activity:     Days per week: Not on file     Minutes per session: Not on file    Stress: Not on file   Relationships    Social connections:     Talks on phone: Not on file     Gets together: Not on file     Attends Je[employer]'s witness service: Not on file     Active member of club or organization: Not on file     Attends meetings of clubs or organizations: Not on file     Relationship status: Not on file    Intimate partner violence:     Fear of current or ex partner: Not on file     Emotionally abused: Not on file     Physically abused: Not on file     Forced sexual activity: Not on file   Other Topics Concern    Not on file   Social History Narrative    Not on file         PHYSICAL EXAM       ED Triage Vitals [02/27/20 1452]   BP Temp Temp Source Pulse Resp SpO2 Height Weight   121/80 97.7 °F (36.5 °C) Oral 75 16 98 % 5' 6\" (1.676 m) 210 lb (95.3 kg)       Physical Exam  Vitals signs and nursing note reviewed. Constitutional:       Appearance: He is well-developed. HENT:      Head: Normocephalic.       Right Ear: External ear normal.      Left Ear: External ear normal.   Eyes:      Conjunctiva/sclera: Conjunctivae normal.      Pupils: Pupils are equal, round, and reactive to light. Neck:      Musculoskeletal: Normal range of motion and neck supple. Cardiovascular:      Rate and Rhythm: Normal rate and regular rhythm. Heart sounds: Normal heart sounds. Pulmonary:      Effort: Pulmonary effort is normal.      Breath sounds: Normal breath sounds. Abdominal:      General: Bowel sounds are normal. There is no distension. Palpations: Abdomen is soft. Tenderness: There is no abdominal tenderness. Musculoskeletal: Normal range of motion. Skin:     General: Skin is warm and dry. Neurological:      Mental Status: He is alert and oriented to person, place, and time. Psychiatric:         Mood and Affect: Mood normal.           MDM  50 yo male presents to the ED with dizziness, chest pain. Pt is afebrile, hemodynamically stable. EKG shows NSR with HR 69, normal axis, normal intervals, no ST changes. Labs remarkable for troponin 0.011. CXR negative. Pt likely with elevated troponin due to recent cardiac cath and PCI. Pt given PO asa in the ED. Given chest pain, dizziness, case discussed with Dr. Dennise Fam. Pt admitted to cardiology for obs for further management. Pt understands plan. FINAL IMPRESSION      1. Chest pain, unspecified type    2.  Shortness of breath          DISPOSITION/PLAN   DISPOSITION Decision To Admit 02/27/2020 04:31:27 PM        DISCHARGE MEDICATIONS:  [unfilled]         Hai King MD(electronically signed)  Attending Emergency Physician            Hai King MD  02/27/20 3195

## 2020-02-28 VITALS
DIASTOLIC BLOOD PRESSURE: 70 MMHG | OXYGEN SATURATION: 97 % | WEIGHT: 210 LBS | HEIGHT: 66 IN | HEART RATE: 50 BPM | BODY MASS INDEX: 33.75 KG/M2 | SYSTOLIC BLOOD PRESSURE: 103 MMHG | RESPIRATION RATE: 16 BRPM | TEMPERATURE: 97.9 F

## 2020-02-28 LAB
EKG ATRIAL RATE: 58 BPM
EKG ATRIAL RATE: 69 BPM
EKG P AXIS: 24 DEGREES
EKG P AXIS: 31 DEGREES
EKG P-R INTERVAL: 184 MS
EKG P-R INTERVAL: 194 MS
EKG Q-T INTERVAL: 390 MS
EKG Q-T INTERVAL: 426 MS
EKG QRS DURATION: 96 MS
EKG QRS DURATION: 96 MS
EKG QTC CALCULATION (BAZETT): 417 MS
EKG QTC CALCULATION (BAZETT): 418 MS
EKG R AXIS: -21 DEGREES
EKG R AXIS: -24 DEGREES
EKG T AXIS: 12 DEGREES
EKG T AXIS: 21 DEGREES
EKG VENTRICULAR RATE: 58 BPM
EKG VENTRICULAR RATE: 69 BPM

## 2020-02-28 PROCEDURE — G0378 HOSPITAL OBSERVATION PER HR: HCPCS

## 2020-02-28 PROCEDURE — 93010 ELECTROCARDIOGRAM REPORT: CPT | Performed by: INTERNAL MEDICINE

## 2020-02-28 PROCEDURE — 93005 ELECTROCARDIOGRAM TRACING: CPT | Performed by: INTERNAL MEDICINE

## 2020-02-28 PROCEDURE — 99235 HOSP IP/OBS SAME DATE MOD 70: CPT | Performed by: INTERNAL MEDICINE

## 2020-02-28 NOTE — FLOWSHEET NOTE
Patient and wife given discharge instructions an follow up informaion. Both verbalized understqnding. Patient D/C'd ambulator to wife's car.

## 2020-02-28 NOTE — DISCHARGE SUMMARY
motion. Thyroid: No thyromegaly. Vascular: Normal carotid pulses. No carotid bruit, hepatojugular reflux or JVD. Trachea: Trachea normal.   Cardiovascular:      Rate and Rhythm: Normal rate and regular rhythm. Chest Wall: PMI is not displaced. Pulses:           Carotid pulses are 3+ on the right side and 3+ on the left side. Radial pulses are 3+ on the right side and 3+ on the left side. Femoral pulses are 3+ on the right side and 3+ on the left side. Popliteal pulses are 3+ on the right side and 3+ on the left side. Dorsalis pedis pulses are 3+ on the right side and 3+ on the left side. Posterior tibial pulses are 3+ on the right side and 3+ on the left side. Heart sounds: Normal heart sounds, S1 normal and S2 normal. Heart sounds not distant. No murmur. No friction rub. No gallop. No S3 or S4 sounds. Pulmonary:      Effort: Pulmonary effort is normal. No tachypnea or respiratory distress. Breath sounds: Normal breath sounds. No wheezing or rales. Chest:      Chest wall: No tenderness. Abdominal:      General: Bowel sounds are normal. There is no distension. Palpations: Abdomen is soft. Abdomen is not rigid. There is no pulsatile mass. Tenderness: There is no abdominal tenderness. There is no guarding or rebound. Musculoskeletal: Normal range of motion. General: No tenderness. Skin:     General: Skin is warm. Findings: No erythema. Neurological:      Mental Status: He is alert and oriented to person, place, and time. Cranial Nerves: No cranial nerve deficit. Psychiatric:         Speech: Speech normal.         Behavior: Behavior normal.         Thought Content:  Thought content normal.         Judgment: Judgment normal.         Medications:  Current Discharge Medication List      CONTINUE these medications which have NOT CHANGED    Details   albuterol sulfate  (90 Base) MCG/ACT inhaler Inhale into the lungs      aspirin 81 MG chewable tablet Take 4 tablets by mouth daily  Qty: 30 tablet, Refills: 3      atorvastatin (LIPITOR) 40 MG tablet Take 1 tablet by mouth daily  Qty: 30 tablet, Refills: 6      clopidogrel (PLAVIX) 75 MG tablet Take 75 mg by mouth daily      Cholecalciferol (VITAMIN D3) 41380 units CAPS Take 50,000 Units by mouth once a week      ALPRAZolam (XANAX) 0.5 MG tablet Take 0.5 mg by mouth every 4 hours as needed for Anxiety. STOP taking these medications       atenolol (TENORMIN) 25 MG tablet Comments:   Reason for Stopping:         lisinopril (PRINIVIL;ZESTRIL) 5 MG tablet Comments:   Reason for Stopping:               Significant Diagnostics:   Radiology: Xr Chest Standard (2 Vw)    Result Date: 2020  Patient MRN: [id number] : 1970 Age:  51 years Gender: Male Order Date: 2020 3:15 PM. Exam: XR CHEST (2 VW) Number of Views: 2 Indication:   Chest Pain  Comparison: None Findings: Cardiomediastinal silhouette is within normal limits.  Lungs are clear without evidence of infiltrate/pneumonia, pneumothorax or pleural effusion     Impression:  No radiographic evidence of acute cardiopulmonary disease       Labs:   Recent Results (from the past 72 hour(s))   EKG 12 Lead    Collection Time: 20  3:10 PM   Result Value Ref Range    Ventricular Rate 69 BPM    Atrial Rate 69 BPM    P-R Interval 184 ms    QRS Duration 96 ms    Q-T Interval 390 ms    QTc Calculation (Bazett) 417 ms    P Axis 24 degrees    R Axis -21 degrees    T Axis 12 degrees   Comprehensive Metabolic Panel    Collection Time: 20  3:15 PM   Result Value Ref Range    Sodium 140 135 - 144 mEq/L    Potassium 3.7 3.4 - 4.9 mEq/L    Chloride 103 95 - 107 mEq/L    CO2 23 20 - 31 mEq/L    Anion Gap 14 9 - 15 mEq/L    Glucose 115 (H) 70 - 99 mg/dL    BUN 16 6 - 20 mg/dL    CREATININE 1.03 0.70 - 1.20 mg/dL    GFR Non-African American >60.0 >60    GFR  >60.0 >60    Calcium 9.3 8.5 - 9.9 mg/dL Total Protein 7.0 6.3 - 8.0 g/dL    Alb 4.1 3.5 - 4.6 g/dL    Total Bilirubin 0.3 0.2 - 0.7 mg/dL    Alkaline Phosphatase 66 35 - 104 U/L    ALT 22 0 - 41 U/L    AST 21 0 - 40 U/L    Globulin 2.9 2.3 - 3.5 g/dL   CBC Auto Differential    Collection Time: 02/27/20  3:15 PM   Result Value Ref Range    WBC 7.5 4.8 - 10.8 K/uL    RBC 4.84 4.70 - 6.10 M/uL    Hemoglobin 14.2 14.0 - 18.0 g/dL    Hematocrit 41.4 (L) 42.0 - 52.0 %    MCV 85.6 80.0 - 100.0 fL    MCH 29.3 27.0 - 31.3 pg    MCHC 34.2 33.0 - 37.0 %    RDW 13.3 11.5 - 14.5 %    Platelets 528 944 - 083 K/uL    Neutrophils % 72.7 %    Lymphocytes % 18.1 %    Monocytes % 5.6 %    Eosinophils % 2.7 %    Basophils % 0.9 %    Neutrophils Absolute 5.4 1.4 - 6.5 K/uL    Lymphocytes Absolute 1.4 1.0 - 4.8 K/uL    Monocytes Absolute 0.4 0.2 - 0.8 K/uL    Eosinophils Absolute 0.2 0.0 - 0.7 K/uL    Basophils Absolute 0.1 0.0 - 0.2 K/uL   Magnesium    Collection Time: 02/27/20  3:15 PM   Result Value Ref Range    Magnesium 2.1 1.7 - 2.4 mg/dL   Troponin    Collection Time: 02/27/20  3:15 PM   Result Value Ref Range    Troponin 0.011 (HH) 0.000 - 0.010 ng/mL   D-Dimer, Quantitative    Collection Time: 02/27/20  3:15 PM   Result Value Ref Range    D-Dimer, Quant 0.46 0.00 - 0.50 mg/L FEU   Troponin    Collection Time: 02/27/20  6:03 PM   Result Value Ref Range    Troponin 0.012 (HH) 0.000 - 0.010 ng/mL   Troponin    Collection Time: 02/27/20  9:09 PM   Result Value Ref Range    Troponin 0.012 (HH) 0.000 - 0.010 ng/mL   EKG 12 Lead    Collection Time: 02/28/20  3:39 AM   Result Value Ref Range    Ventricular Rate 58 BPM    Atrial Rate 58 BPM    P-R Interval 194 ms    QRS Duration 96 ms    Q-T Interval 426 ms    QTc Calculation (Bazett) 418 ms    P Axis 31 degrees    R Axis -24 degrees    T Axis 21 degrees          normal sinus rhythm, nonspecific ST and T waves changes    Follow-up visits:   Flako Sutton [address]  [address] [facility]  [phone]

## 2020-03-05 LAB
ANION GAP SERPL CALCULATED.3IONS-SCNC: 10 MMOL/L (ref 10–20)
APTT: 33 SEC (ref 28–38)
BICARBONATE: 27 MMOL/L (ref 21–32)
CALCIUM SERPL-MCNC: 9.1 MG/DL (ref 8.6–10.3)
CHLORIDE BLD-SCNC: 107 MMOL/L (ref 98–107)
CREAT SERPL-MCNC: 1.08 MG/DL (ref 0.5–1.3)
ERYTHROCYTE [DISTWIDTH] IN BLOOD BY AUTOMATED COUNT: 12.4 % (ref 11.5–14)
GFR AFRICAN AMERICAN: >60 ML/MIN/1.73M2
GFR NON-AFRICAN AMERICAN: >60 ML/MIN/1.73M2
GLUCOSE: 90 MG/DL (ref 74–99)
HCT VFR BLD CALC: 42.5 % (ref 41–52)
HEMOGLOBIN: 14 G/DL (ref 13.5–17)
INR BLD: 1 (ref 0.9–1.1)
MCHC RBC AUTO-ENTMCNC: 32.9 G/DL (ref 32–36)
MCV RBC AUTO: 86 FL (ref 80–100)
PLATELET # BLD: 228 X10E9/L (ref 150–450)
POTASSIUM SERPL-SCNC: 4.1 MMOL/L (ref 3.5–5.3)
PROTHROMBIN TIME: 11.4 SEC (ref 9.7–12.7)
RBC # BLD: 4.95 X10E12/L (ref 4.5–5.9)
SODIUM BLD-SCNC: 140 MMOL/L (ref 136–145)
UREA NITROGEN: 22 MG/DL (ref 6–23)
WBC: 6.3 X10E9/L (ref 4.4–11.3)

## 2020-03-06 ENCOUNTER — TELEPHONE (OUTPATIENT)
Dept: CARDIOLOGY CLINIC | Age: 50
End: 2020-03-06

## 2020-03-06 LAB
ANION GAP SERPL CALCULATED.3IONS-SCNC: 12 MMOL/L (ref 10–20)
BICARBONATE: 26 MMOL/L (ref 21–32)
CALCIUM SERPL-MCNC: 9.1 MG/DL (ref 8.6–10.3)
CHLORIDE BLD-SCNC: 102 MMOL/L (ref 98–107)
CREAT SERPL-MCNC: 1.06 MG/DL (ref 0.5–1.3)
ERYTHROCYTE [DISTWIDTH] IN BLOOD BY AUTOMATED COUNT: 12.5 % (ref 11.5–14)
GFR AFRICAN AMERICAN: >60 ML/MIN/1.73M2
GFR NON-AFRICAN AMERICAN: >60 ML/MIN/1.73M2
GLUCOSE: 103 MG/DL (ref 74–99)
HCT VFR BLD CALC: 43 % (ref 41–52)
HEMOGLOBIN: 14.3 G/DL (ref 13.5–17)
MCHC RBC AUTO-ENTMCNC: 33.3 G/DL (ref 32–36)
MCV RBC AUTO: 87 FL (ref 80–100)
PLATELET # BLD: 225 X10E9/L (ref 150–450)
POTASSIUM SERPL-SCNC: 4.7 MMOL/L (ref 3.5–5.3)
RBC # BLD: 4.97 X10E12/L (ref 4.5–5.9)
SODIUM BLD-SCNC: 135 MMOL/L (ref 136–145)
UREA NITROGEN: 21 MG/DL (ref 6–23)
WBC: 10.6 X10E9/L (ref 4.4–11.3)

## 2020-03-10 LAB — ACTIVATED CLOTTING TIME, LOW RANGE: 263 SECONDS (ref 89–169)

## 2020-04-07 ENCOUNTER — VIRTUAL VISIT (OUTPATIENT)
Dept: CARDIOLOGY CLINIC | Age: 50
End: 2020-04-07
Payer: COMMERCIAL

## 2020-04-07 PROCEDURE — 99213 OFFICE O/P EST LOW 20 MIN: CPT | Performed by: INTERNAL MEDICINE

## 2020-04-07 ASSESSMENT — ENCOUNTER SYMPTOMS
NAUSEA: 0
VOMITING: 0
ABDOMINAL PAIN: 0
SHORTNESS OF BREATH: 0
GASTROINTESTINAL NEGATIVE: 1
EYES NEGATIVE: 1
WHEEZING: 0

## 2020-04-07 NOTE — PROGRESS NOTES
2020    TELEHEALTH EVALUATION -- Audio/Visual (During GBUDD-41 public health emergency)    Due to Mayra 19 outbreak, patient's office visit was converted to a virtual visit. Patient was contacted and agreed to proceed with a virtual visit via [email or web address] me  The risks and benefits of converting to a virtual visit were discussed in light of the current infectious disease epidemic. Patient also understood that insurance coverage and co-pays are up to their individual insurance plans. HPI:    Anitha Aparicio (:  1970) has requested an audio/video evaluation for the following concern(s): PFO    S/p PCI of mid LAD. S/p PFO closure with [place] [facility]. Pt denies chest pain, dyspnea, dyspnea on exertion, change in exercise capacity, fatigue,  nausea, vomiting, diarrhea, constipation, motor weakness, insomnia, weight loss, syncope, dizziness, lightheadedness, palpitations, PND, orthopnea, or claudication. No nitro use. BP and hr are good. CAD is stable. No LE discoloration or ulcers. No LE edema. No CHF type symptoms. Lipid profile is normal. No recent hospitalization. No change in meds. On DAPT. No bleeding issues. Has had some chest fluttering at times 1-2xday. No neuro type symptoms. Review of Systems   Constitutional: Negative. Negative for chills and fever. Eyes: Negative. Respiratory: Negative for shortness of breath and wheezing. Cardiovascular: Negative for chest pain, palpitations and leg swelling. Gastrointestinal: Negative. Negative for abdominal pain, nausea and vomiting. Skin: Negative. Negative for rash. Neurological: Negative for dizziness, weakness and headaches. Prior to Visit Medications    Medication Sig Taking?  Authorizing Provider   albuterol sulfate  (90 Base) MCG/ACT inhaler Inhale into the lungs Yes Historical Provider, MD   aspirin 81 MG chewable tablet Take 4 tablets by mouth daily  Patient taking differently: Take 325 mg by mouth daily

## 2020-06-12 ENCOUNTER — HOSPITAL ENCOUNTER (OUTPATIENT)
Dept: NON INVASIVE DIAGNOSTICS | Age: 50
Discharge: HOME | End: 2020-06-12
Payer: COMMERCIAL

## 2020-06-12 PROCEDURE — 93226 XTRNL ECG REC<48 HR SCAN A/R: CPT

## 2020-06-12 PROCEDURE — 93308 TTE F-UP OR LMTD: CPT

## 2020-06-12 PROCEDURE — 93225 XTRNL ECG REC<48 HRS REC: CPT

## 2020-06-30 PROCEDURE — 93227 XTRNL ECG REC<48 HR R&I: CPT | Performed by: INTERNAL MEDICINE

## 2020-09-23 RX ORDER — ATORVASTATIN CALCIUM 40 MG/1
TABLET, FILM COATED ORAL
Qty: 30 TABLET | Refills: 5 | Status: SHIPPED | OUTPATIENT
Start: 2020-09-23 | End: 2021-05-03

## 2020-10-22 ENCOUNTER — OFFICE VISIT (OUTPATIENT)
Dept: CARDIOLOGY CLINIC | Age: 50
End: 2020-10-22
Payer: COMMERCIAL

## 2020-10-22 VITALS
BODY MASS INDEX: 37.28 KG/M2 | DIASTOLIC BLOOD PRESSURE: 84 MMHG | WEIGHT: 231 LBS | TEMPERATURE: 97.4 F | SYSTOLIC BLOOD PRESSURE: 112 MMHG | HEART RATE: 50 BPM

## 2020-10-22 PROBLEM — R07.9 CHEST PAIN: Status: RESOLVED | Noted: 2020-02-27 | Resolved: 2020-10-22

## 2020-10-22 PROBLEM — I25.10 CORONARY ARTERY DISEASE INVOLVING NATIVE CORONARY ARTERY OF NATIVE HEART WITHOUT ANGINA PECTORIS: Status: ACTIVE | Noted: 2020-10-22

## 2020-10-22 PROCEDURE — 93000 ELECTROCARDIOGRAM COMPLETE: CPT | Performed by: INTERNAL MEDICINE

## 2020-10-22 PROCEDURE — 99214 OFFICE O/P EST MOD 30 MIN: CPT | Performed by: INTERNAL MEDICINE

## 2020-10-22 RX ORDER — MECLIZINE HYDROCHLORIDE 25 MG/1
25 TABLET ORAL 3 TIMES DAILY PRN
Qty: 90 TABLET | Refills: 6 | Status: SHIPPED | OUTPATIENT
Start: 2020-10-22 | End: 2022-03-03 | Stop reason: ALTCHOICE

## 2020-10-22 ASSESSMENT — ENCOUNTER SYMPTOMS
WHEEZING: 0
SHORTNESS OF BREATH: 0
ABDOMINAL PAIN: 0
GASTROINTESTINAL NEGATIVE: 1
EYES NEGATIVE: 1
VOMITING: 0
NAUSEA: 0

## 2020-10-22 NOTE — PROGRESS NOTES
by mouth daily Yes Historical Provider, MD   meclizine (ANTIVERT) 25 MG tablet Take 1 tablet by mouth 3 times daily as needed for Dizziness Yes Kirt FLEMING Holiday, DO   atorvastatin (LIPITOR) 40 MG tablet TAKE ONE TABLET BY MOUTH EVERY DAY Yes Kirt De Leoniday, DO   albuterol sulfate  (90 Base) MCG/ACT inhaler Inhale into the lungs Yes Historical Provider, MD   clopidogrel (PLAVIX) 75 MG tablet Take 75 mg by mouth daily Yes Historical Provider, MD   Cholecalciferol (VITAMIN D3) 25938 units CAPS Take 50,000 Units by mouth once a week Yes Historical Provider, MD   ALPRAZolam (XANAX) 0.5 MG tablet Take 0.5 mg by mouth every 4 hours as needed for Anxiety. Yes Historical Provider, MD       Social History     Tobacco Use    Smoking status: Current Every Day Smoker     Types: Cigars    Smokeless tobacco: Never Used   Substance Use Topics    Alcohol use: Not Currently    Drug use: Not Currently        No Known Allergies,   Past Medical History:   Diagnosis Date    Anxiety     Coronary artery disease involving native coronary artery of native heart without angina pectoris 10/22/2020    [facility]cere[facility] accident) [facility] [address]     Hyperlipidemia     WOOD (obstructive sleep apnea)     PFO (patent foramen ovale)    ,   Past Surgical History:   Procedure Laterality Date    DIAGNOSTIC CARDIAC CATH LAB PROCEDURE  02/27/2020    PERIPHERAL PERCUTANEOUS ARTERIAL INTERVENTION  02/27/2020   , No family history on file. Physical Examination:  General appearance - alert, well appearing, and in no distress and overweight  Mental status - alert, oriented to person, place, and time  Neck - Neck is supple, no JVD or carotid bruits. No thyromegaly or adenopathy.    Chest - clear to auscultation, no wheezes, rales or rhonchi, symmetric air entry  Heart - normal rate, regular rhythm, normal S1, S2, no murmurs, rubs, clicks or gallops  Abdomen - soft, nontender, nondistended, no masses or organomegaly  Neurological - alert, oriented, normal speech, no focal findings or movement disorder noted  Extremities - peripheral pulses normal, no pedal edema, no clubbing or cyanosis  Skin - normal coloration and turgor, no rashes, no suspicious skin lesions noted    ASSESSMENT:        Diagnosis Orders   1. Dizziness  EKG 12 Lead   2. PFO (patent foramen ovale)  Echo limited   3. WOOD (obstructive sleep apnea)     4. Coronary artery disease involving native coronary artery of native heart without angina pectoris         PLAN:      Pursuant to the emergency declaration under the [facility], [facility] waiver authority and the [employer] Act, this Virtual  Visit was conducted, with patient's consent, to reduce the patient's risk of exposure to COVID-19 and provide continuity of care for an established patient. Services were provided through a video synchronous discussion virtually to substitute for in-person clinic visit. Repeat ECho for PFO closure and then one year after implant. Cont with DAPT for one year post PCI    Follow-up with neurology, patient has an appointment soon. Meclizine. Patient was advised and encouraged to check blood pressure at home or at a pharmacy, maintain a logbook, and also call us back if blood pressure are above the target ranges or if it is low. Patient clearly understands and agrees to the instructions. We will need to continue to monitor muscle and liver enzymes, BUN, CR, and electrolytes. The nature of cardiac risk has been fully discussed with this patient. I have made him aware of his LDL target goal given his cardiovascular risk analysis. I have discussed the appropriate diet. The need for lifelong compliance in order to reduce risk is stressed. A regular exercise program is recommended to help achieve and maintain normal body weight, fitness and improve lipid balance. No follow-ups on file.     An  electronic signature was used to authenticate this note.     --Kelle Low, DO on 10/22/2020 at 12:16 PM

## 2020-11-18 PROBLEM — R27.0 ATAXIA: Status: ACTIVE | Noted: 2020-11-18

## 2020-11-18 PROBLEM — R45.1 RESTLESSNESS AND AGITATION: Status: ACTIVE | Noted: 2020-11-18

## 2020-11-18 PROBLEM — Z72.0 TOBACCO USE: Status: ACTIVE | Noted: 2018-12-05

## 2020-11-18 PROBLEM — Z86.59 H/O: DEPRESSION: Status: ACTIVE | Noted: 2020-11-18

## 2020-11-18 PROBLEM — H60.329: Status: ACTIVE | Noted: 2020-11-18

## 2020-11-18 PROBLEM — E78.2 MIXED HYPERLIPIDEMIA: Status: ACTIVE | Noted: 2019-01-29

## 2020-11-18 PROBLEM — F19.90 SUBSTANCE USE DISORDER: Status: ACTIVE | Noted: 2020-11-18

## 2020-11-18 PROBLEM — M25.559 HIP PAIN: Status: ACTIVE | Noted: 2020-11-18

## 2020-11-18 PROBLEM — J30.9 ALLERGIC RHINITIS: Status: ACTIVE | Noted: 2020-11-18

## 2020-11-18 PROBLEM — R00.1 BRADYCARDIA, UNSPECIFIED: Status: ACTIVE | Noted: 2019-02-01

## 2020-11-18 PROBLEM — M54.15 RADICULOPATHY, THORACOLUMBAR REGION: Status: ACTIVE | Noted: 2019-02-01

## 2020-11-18 PROBLEM — F41.0 PANIC DISORDER: Status: ACTIVE | Noted: 2018-12-05

## 2020-11-18 PROBLEM — J98.01 BRONCHOSPASM: Status: ACTIVE | Noted: 2020-11-18

## 2020-11-18 PROBLEM — G47.33 OBSTRUCTIVE SLEEP APNEA SYNDROME: Status: ACTIVE | Noted: 2019-02-01

## 2020-11-18 PROBLEM — E78.00 PURE HYPERCHOLESTEROLEMIA, UNSPECIFIED: Status: ACTIVE | Noted: 2018-12-05

## 2020-11-18 PROBLEM — R42 DIZZINESS AND GIDDINESS: Status: ACTIVE | Noted: 2018-09-11

## 2020-11-18 PROBLEM — Z86.73 PRSNL HX OF TIA (TIA), AND CEREB INFRC W/O RESID DEFICITS: Status: ACTIVE | Noted: 2018-12-05

## 2020-11-18 PROBLEM — K21.9 ESOPHAGEAL REFLUX: Status: ACTIVE | Noted: 2020-11-18

## 2020-11-18 PROBLEM — F12.10 CANNABIS ABUSE: Status: ACTIVE | Noted: 2018-09-25

## 2020-11-18 PROBLEM — I63.522 CEREBROVASCULAR ACCIDENT (CVA) DUE TO STENOSIS OF LEFT ANTERIOR CEREBRAL ARTERY (HCC): Status: ACTIVE | Noted: 2018-07-17

## 2020-11-18 PROBLEM — E66.3 OVERWEIGHT: Status: ACTIVE | Noted: 2018-09-25

## 2020-11-18 PROBLEM — I10 ESSENTIAL (PRIMARY) HYPERTENSION: Status: ACTIVE | Noted: 2019-01-29

## 2020-11-18 PROBLEM — G81.91 HEMIPLEGIA, UNSPECIFIED AFFECTING RIGHT DOMINANT SIDE (HCC): Status: ACTIVE | Noted: 2018-07-17

## 2020-11-18 PROBLEM — R41.0 SUBACUTE DELIRIUM: Status: ACTIVE | Noted: 2020-11-18

## 2020-11-18 PROBLEM — R73.9 HYPERGLYCEMIA: Status: ACTIVE | Noted: 2020-11-18

## 2020-11-18 PROBLEM — R41.3 MEMORY IMPAIRMENT: Status: ACTIVE | Noted: 2020-11-18

## 2020-11-18 PROBLEM — R11.2 NAUSEA WITH VOMITING, UNSPECIFIED: Status: ACTIVE | Noted: 2018-12-05

## 2020-11-18 PROBLEM — R00.1 SINUS BRADYCARDIA: Status: ACTIVE | Noted: 2020-11-18

## 2020-11-18 PROBLEM — R20.0 ANESTHESIA OF SKIN: Status: ACTIVE | Noted: 2018-09-11

## 2020-11-18 PROBLEM — R29.810 FACIAL WEAKNESS: Status: ACTIVE | Noted: 2018-07-17

## 2020-11-18 PROBLEM — J45.20 MILD INTERMITTENT ASTHMA: Status: ACTIVE | Noted: 2020-11-18

## 2020-11-18 PROBLEM — R40.0 DAYTIME SOMNOLENCE: Status: ACTIVE | Noted: 2020-11-18

## 2020-11-18 PROBLEM — R53.83 OTHER FATIGUE: Status: ACTIVE | Noted: 2019-02-01

## 2020-11-18 PROBLEM — R20.2 PARESTHESIA OF LOWER EXTREMITY: Status: ACTIVE | Noted: 2018-09-11

## 2020-11-18 PROBLEM — N39.0 UTI (URINARY TRACT INFECTION), UNCOMPLICATED: Status: ACTIVE | Noted: 2019-01-29

## 2020-11-18 PROBLEM — R63.5 ABNORMAL WEIGHT GAIN: Status: ACTIVE | Noted: 2019-01-29

## 2020-11-18 PROBLEM — M54.16 LUMBAR RADICULOPATHY: Status: ACTIVE | Noted: 2018-12-05

## 2020-11-18 PROBLEM — F39 UNSPECIFIED MOOD (AFFECTIVE) DISORDER (HCC): Status: ACTIVE | Noted: 2018-12-05

## 2020-11-18 PROBLEM — R94.31 ABNORMAL ELECTROCARDIOGRAM: Status: ACTIVE | Noted: 2018-09-25

## 2020-11-18 PROBLEM — I63.9 CEREBROVASCULAR ACCIDENT (CVA) (HCC): Status: ACTIVE | Noted: 2018-07-17

## 2020-11-18 PROBLEM — Z82.49 FAMILY HISTORY OF ISCHEMIC HEART DISEASE AND OTHER DISEASES OF THE CIRCULATORY SYSTEM: Status: ACTIVE | Noted: 2018-09-25

## 2020-11-18 PROBLEM — R06.09 DYSPNEA ON EXERTION: Status: ACTIVE | Noted: 2019-02-01

## 2020-11-18 PROBLEM — Z79.02 LONG TERM (CURRENT) USE OF ANTITHROMBOTICS/ANTIPLATELETS: Status: ACTIVE | Noted: 2018-12-05

## 2020-11-18 PROBLEM — E55.9 VITAMIN D DEFICIENCY: Status: ACTIVE | Noted: 2020-11-18

## 2020-11-20 ENCOUNTER — TELEPHONE (OUTPATIENT)
Dept: CARDIOLOGY CLINIC | Age: 50
End: 2020-11-20

## 2020-11-20 ENCOUNTER — OFFICE VISIT (OUTPATIENT)
Dept: NEUROLOGY | Age: 50
End: 2020-11-20
Payer: COMMERCIAL

## 2020-11-20 VITALS
DIASTOLIC BLOOD PRESSURE: 84 MMHG | BODY MASS INDEX: 36.48 KG/M2 | WEIGHT: 226 LBS | HEART RATE: 68 BPM | SYSTOLIC BLOOD PRESSURE: 122 MMHG

## 2020-11-20 PROBLEM — F90.0 ATTENTION DEFICIT HYPERACTIVITY DISORDER (ADHD), PREDOMINANTLY INATTENTIVE TYPE: Status: ACTIVE | Noted: 2020-11-20

## 2020-11-20 PROCEDURE — 99214 OFFICE O/P EST MOD 30 MIN: CPT | Performed by: PSYCHIATRY & NEUROLOGY

## 2020-11-20 RX ORDER — NITROGLYCERIN 0.4 MG/1
0.4 TABLET SUBLINGUAL
COMMUNITY
Start: 2020-02-27 | End: 2021-04-16

## 2020-11-20 RX ORDER — DEXTROAMPHETAMINE SACCHARATE, AMPHETAMINE ASPARTATE MONOHYDRATE, DEXTROAMPHETAMINE SULFATE AND AMPHETAMINE SULFATE 2.5; 2.5; 2.5; 2.5 MG/1; MG/1; MG/1; MG/1
10 CAPSULE, EXTENDED RELEASE ORAL EVERY MORNING
Qty: 30 CAPSULE | Refills: 0 | Status: SHIPPED | OUTPATIENT
Start: 2020-11-20 | End: 2020-12-22 | Stop reason: SDUPTHER

## 2020-11-20 RX ORDER — ERGOCALCIFEROL 1.25 MG/1
CAPSULE ORAL
COMMUNITY
Start: 2018-03-14 | End: 2021-04-16

## 2020-11-20 ASSESSMENT — ENCOUNTER SYMPTOMS
COLOR CHANGE: 0
PHOTOPHOBIA: 0
SHORTNESS OF BREATH: 0
VOMITING: 0
BACK PAIN: 0
TROUBLE SWALLOWING: 0
CHOKING: 0
NAUSEA: 0

## 2020-11-20 NOTE — PROGRESS NOTES
Subjective:      Patient ID: Stefanie Antonio is a 51 y.o. male who presents today for:  Chief Complaint   Patient presents with    Follow-up     Pt states that he is not doing so good. He states that he has been having some dizzy spells more recently, memory seems to be getting worse, and is having some confusion. He states that his short term memory has been horrible. Pt states that if he has to put a list of things together in his head so that he can get them done and if his wife will tell him something esle needs to be done he will lose consentration and will get frustrated and not remember where he was at in the things that he was doing. HPI 16-year-old right-handed gentleman with a history of for cerebral stroke. Patient had a stroke in July 2018 and was seen at [facility] [place].  Patient was treated TPA then we had investigated him. He does have a PFO with thickening of the intra-atrial septum but no true aneurysm was noted. She is having some memory issues. This last seen we had recommended PFO closure and he had a PFO closure February 2020. I had seen him just prior to that. Follow-up echo is pending for February next year. Actually doing well from his risk factor management as well. His main issues appears to be difficulty focusing and concentration now. Is not so much concentration but distraction. This is quite bothersome to him and he has not been able to socialize due to the same.       Past Medical History:   Diagnosis Date    Anxiety     Coronary artery disease involving native coronary artery of native heart without angina pectoris 10/22/2020    CVA (cerebral vascular accident) ([address]     Hyperlipidemia     WOOD (obstructive sleep apnea)     PFO (patent foramen ovale)      Past Surgical History:   Procedure Laterality Date    DIAGNOSTIC CARDIAC CATH LAB PROCEDURE  02/27/2020    PERIPHERAL PERCUTANEOUS ARTERIAL INTERVENTION  02/27/2020     Social History     Socioeconomic History    Marital status:      Spouse name: Not on file    Number of children: Not on file    Years of education: Not on file    Highest education level: Not on file   Occupational History    Not on file   Social Needs    Financial resource strain: Not on file    Food insecurity     Worry: Not on file     Inability: Not on file    Transportation needs     Medical: Not on file     Non-medical: Not on file   Tobacco Use    Smoking status: Current Every Day Smoker     Types: Cigars    Smokeless tobacco: Never Used   Substance and Sexual Activity    Alcohol use: Not Currently    Drug use: Not Currently    Sexual activity: Yes   Lifestyle    Physical activity     Days per week: Not on file     Minutes per session: Not on file    Stress: Not on file   Relationships    Social connections     Talks on phone: Not on file     Gets together: Not on file     Attends Che[employer] service: Not on file     Active member of club or organization: Not on file     Attends meetings of clubs or organizations: Not on file     Relationship status: Not on file    Intimate partner violence     Fear of current or ex partner: Not on file     Emotionally abused: Not on file     Physically abused: Not on file     Forced sexual activity: Not on file   Other Topics Concern    Not on file   Social History Narrative    Not on file     No family history on file. No Known Allergies    Current Outpatient Medications   Medication Sig Dispense Refill    vitamin D (ERGOCALCIFEROL) 1.25 MG (55113 UT) CAPS capsule Take by mouth      nitroGLYCERIN (NITROSTAT) 0.4 MG SL tablet Place 0.4 mg under the tongue      amphetamine-dextroamphetamine (ADDERALL XR) 10 MG extended release capsule Take 1 capsule by mouth every morning for 30 days.  30 capsule 0    aspirin 325 MG EC tablet Take 325 mg by mouth daily      meclizine (ANTIVERT) 25 MG tablet Take 1 tablet by mouth 3 times daily as needed for Dizziness 90 tablet 6    atorvastatin (LIPITOR) 40 MG tablet TAKE ONE TABLET BY MOUTH EVERY DAY 30 tablet 5    albuterol sulfate  (90 Base) MCG/ACT inhaler Inhale into the lungs      clopidogrel (PLAVIX) 75 MG tablet Take 75 mg by mouth daily      Cholecalciferol (VITAMIN D3) 42065 units CAPS Take 50,000 Units by mouth once a week      ALPRAZolam (XANAX) 0.5 MG tablet Take 0.5 mg by mouth every 4 hours as needed for Anxiety. No current facility-administered medications for this visit. Review of Systems   Constitutional: Negative for fever. HENT: Negative for ear pain, tinnitus and trouble swallowing. Eyes: Negative for photophobia and visual disturbance. Respiratory: Negative for choking and shortness of breath. Cardiovascular: Negative for chest pain and palpitations. Gastrointestinal: Negative for nausea and vomiting. Musculoskeletal: Negative for back pain, gait problem, joint swelling, myalgias, neck pain and neck stiffness. Skin: Negative for color change. Allergic/Immunologic: Negative for food allergies. Neurological: Negative for dizziness, tremors, seizures, syncope, facial asymmetry, speech difficulty, weakness, light-headedness, numbness and headaches. Psychiatric/Behavioral: Negative for behavioral problems, confusion, hallucinations and sleep disturbance. Objective:   /84 (Site: Right Upper Arm, Position: Sitting, Cuff Size: Medium Adult)   Pulse 68   Wt 226 lb (102.5 kg)   BMI 36.48 kg/m²     Physical Exam  Vitals signs reviewed. Eyes:      Pupils: Pupils are equal, round, and reactive to light. Neck:      Musculoskeletal: Normal range of motion. Cardiovascular:      Rate and Rhythm: Normal rate and regular rhythm. Heart sounds: No murmur. Pulmonary:      Effort: Pulmonary effort is normal.      Breath sounds: Normal breath sounds. Abdominal:      General: Bowel sounds are normal.   Musculoskeletal: Normal range of motion. Skin:     General: Skin is warm. Neurological:      Mental Status: He is alert and oriented to person, place, and time. Cranial Nerves: No cranial nerve deficit. Sensory: No sensory deficit. Motor: No abnormal muscle tone. Coordination: Coordination normal.      Deep Tendon Reflexes: Reflexes are normal and symmetric. Babinski sign absent on the right side. Babinski sign absent on the left side. Psychiatric:         Mood and Affect: Mood normal.     ADD scores were reviewed. No results found. Lab Results   Component Value Date    WBC 10.6 03/06/2020    WBC 7.5 02/27/2020    RBC 4.97 03/06/2020    RBC 4.62 07/18/2018    HGB 14.3 03/06/2020    HCT 43.0 03/06/2020    MCV 87 03/06/2020    MCH 29.3 02/27/2020    MCHC 33.3 03/06/2020    RDW 13.3 02/27/2020     03/06/2020    MPV 11.1 07/18/2018     Lab Results   Component Value Date     03/06/2020    K 4.7 03/06/2020     03/06/2020    CO2 23 02/27/2020    BUN 16 02/27/2020    CREATININE 1.06 03/06/2020    GFRAA >60 03/06/2020    LABGLOM >60 03/06/2020    GLUCOSE 103 03/06/2020    PROT 7.0 02/27/2020    LABALBU 4.1 02/27/2020    CALCIUM 9.1 03/06/2020    BILITOT 0.3 02/27/2020    ALKPHOS 66 02/27/2020    AST 21 02/27/2020    ALT 22 02/27/2020     Lab Results   Component Value Date    PROTIME 11.4 03/05/2020    INR 1.0 03/05/2020     No results found for: TSH, [id number], FOLATE, FERRITIN, IRON, TIBC, PTRFSAT, TSH, FREET4  Lab Results   Component Value Date    TRIG 82 07/18/2018    HDL 58 07/18/2018    LDLCALC 145 07/18/2018    LABVLDL 16 07/18/2018     No results found for: LABAMPH, BARBSCNU, LABBENZ, CANNAB, COCAINESCRN, LABMETH, OPIATESCREENURINE, PHENCYCLIDINESCREENURINE, PPXUR, ETOH  No results found for: LITHIUM, DILFRTOT, VALPROATE    Assessment:       Diagnosis Orders   1. Hemiplegia of right dominant side as late effect of cerebral infarction, unspecified hemiplegia type ([facility] [address]     2.  Attention deficit hyperactivity disorder (ADHD), predominantly inattentive type  amphetamine-dextroamphetamine (ADDERALL XR) 10 MG extended release capsule   3. PFO (patent foramen ovale)       Bihemispheric strokes July 2018 patient was treated TPA but no other investigations done. He later found out that patient had a PFO. We had further referred him to Dr. Reyna Manning and in February this year patient had a closure of his PFO and is doing actually quite well. She has recovered very well. Neck soft his main issues today appears to be difficulty with distraction. This appears to concern for him as he has to isolate himself he cannot just concentrate with his to many pills around. This affecting his life. I recommend that we try him on a very low-dose of Adderall to see if this helps. If he does then we can give you afternoon dose as well. There is no contraindication of use of the medication in this gentleman. Oars reports are checked. History from the stroke point he has recovered quite well his risk factors are well managed and will keep an eye on his risk factors. Dr. Kylie Iglesias lesion and notes are reviewed in detail regarding the closure. Plan:      No orders of the defined types were placed in this encounter. Orders Placed This Encounter   Medications    amphetamine-dextroamphetamine (ADDERALL XR) 10 MG extended release capsule     Sig: Take 1 capsule by mouth every morning for 30 days. Dispense:  30 capsule     Refill:  0       No follow-ups on file.       Jody Noriega MD

## 2020-12-22 RX ORDER — DEXTROAMPHETAMINE SACCHARATE, AMPHETAMINE ASPARTATE MONOHYDRATE, DEXTROAMPHETAMINE SULFATE AND AMPHETAMINE SULFATE 2.5; 2.5; 2.5; 2.5 MG/1; MG/1; MG/1; MG/1
10 CAPSULE, EXTENDED RELEASE ORAL EVERY MORNING
Qty: 30 CAPSULE | Refills: 0 | Status: SHIPPED | OUTPATIENT
Start: 2020-12-22 | End: 2021-01-19 | Stop reason: SDUPTHER

## 2021-01-19 DIAGNOSIS — F90.0 ATTENTION DEFICIT HYPERACTIVITY DISORDER (ADHD), PREDOMINANTLY INATTENTIVE TYPE: ICD-10-CM

## 2021-01-19 RX ORDER — DEXTROAMPHETAMINE SACCHARATE, AMPHETAMINE ASPARTATE MONOHYDRATE, DEXTROAMPHETAMINE SULFATE AND AMPHETAMINE SULFATE 2.5; 2.5; 2.5; 2.5 MG/1; MG/1; MG/1; MG/1
10 CAPSULE, EXTENDED RELEASE ORAL EVERY MORNING
Qty: 30 CAPSULE | Refills: 0 | Status: SHIPPED | OUTPATIENT
Start: 2021-01-19 | End: 2021-02-01 | Stop reason: SDUPTHER

## 2021-01-19 NOTE — TELEPHONE ENCOUNTER
Requested Prescriptions     Pending Prescriptions Disp Refills    amphetamine-dextroamphetamine (ADDERALL XR) 10 MG extended release capsule 30 capsule 0     Sig: Take 1 capsule by mouth every morning for 30 days.      Please sign tox screen

## 2021-01-20 DIAGNOSIS — F90.0 ATTENTION DEFICIT HYPERACTIVITY DISORDER (ADHD), PREDOMINANTLY INATTENTIVE TYPE: ICD-10-CM

## 2021-01-22 LAB
6-ACETYLMORPHINE: NOT DETECTED
7-AMINOCLONAZEPAM: NOT DETECTED
ALPHA-OH-ALPRAZOLAM: NOT DETECTED
ALPRAZOLAM: NOT DETECTED
AMPHETAMINE: PRESENT
BARBITURATES: NOT DETECTED
BENZOYLECGONINE: NOT DETECTED
BUPRENORPHINE: NOT DETECTED
CARISOPRODOL: NOT DETECTED
CLONAZEPAM: NOT DETECTED
CODEINE: NOT DETECTED
CREATININE URINE: 152.4 MG/DL (ref 20–400)
DIAZEPAM: NOT DETECTED
EER PAIN MGT DRUG PANEL, HIGH RES/EMIT U: NORMAL
ETHYL GLUCURONIDE: NOT DETECTED
FENTANYL: NOT DETECTED
HYDROCODONE: NOT DETECTED
HYDROMORPHONE: NOT DETECTED
LORAZEPAM: NOT DETECTED
MARIJUANA METABOLITE: NOT DETECTED
MDA: NOT DETECTED
MDEA: NOT DETECTED
MDMA URINE: NOT DETECTED
MEPERIDINE: NOT DETECTED
METHADONE: NOT DETECTED
METHAMPHETAMINE: NOT DETECTED
METHYLPHENIDATE: NOT DETECTED
MIDAZOLAM: NOT DETECTED
MORPHINE: NOT DETECTED
NORBUPRENORPHINE, FREE: NOT DETECTED
NORDIAZEPAM: NOT DETECTED
NORFENTANYL: NOT DETECTED
NORHYDROCODONE, URINE: NOT DETECTED
NOROXYCODONE: NOT DETECTED
NOROXYMORPHONE, URINE: NOT DETECTED
OXAZEPAM: NOT DETECTED
OXYCODONE: NOT DETECTED
OXYMORPHONE: NOT DETECTED
PAIN MANAGEMENT DRUG PANEL: NORMAL
PCP: NOT DETECTED
PHENTERMINE: NOT DETECTED
PROPOXYPHENE: NOT DETECTED
TAPENTADOL, URINE: NOT DETECTED
TAPENTADOL-O-SULFATE, URINE: NOT DETECTED
TEMAZEPAM: NOT DETECTED
TRAMADOL: NOT DETECTED
ZOLPIDEM: NOT DETECTED

## 2021-02-01 DIAGNOSIS — F90.0 ATTENTION DEFICIT HYPERACTIVITY DISORDER (ADHD), PREDOMINANTLY INATTENTIVE TYPE: ICD-10-CM

## 2021-02-01 RX ORDER — DEXTROAMPHETAMINE SACCHARATE, AMPHETAMINE ASPARTATE MONOHYDRATE, DEXTROAMPHETAMINE SULFATE AND AMPHETAMINE SULFATE 2.5; 2.5; 2.5; 2.5 MG/1; MG/1; MG/1; MG/1
10 CAPSULE, EXTENDED RELEASE ORAL EVERY MORNING
Qty: 30 CAPSULE | Refills: 0 | Status: SHIPPED | OUTPATIENT
Start: 2021-02-01 | End: 2021-03-01 | Stop reason: SDUPTHER

## 2021-02-01 NOTE — TELEPHONE ENCOUNTER
Pt did tox screen on 2/21 and did not come back to office to  script, can you please send to the pharmacy for him and will shred the one in file. Thanks! Requested Prescriptions     Pending Prescriptions Disp Refills    amphetamine-dextroamphetamine (ADDERALL XR) 10 MG extended release capsule 30 capsule 0     Sig: Take 1 capsule by mouth every morning for 30 days.

## 2021-03-01 DIAGNOSIS — F90.0 ATTENTION DEFICIT HYPERACTIVITY DISORDER (ADHD), PREDOMINANTLY INATTENTIVE TYPE: ICD-10-CM

## 2021-03-01 RX ORDER — DEXTROAMPHETAMINE SACCHARATE, AMPHETAMINE ASPARTATE MONOHYDRATE, DEXTROAMPHETAMINE SULFATE AND AMPHETAMINE SULFATE 2.5; 2.5; 2.5; 2.5 MG/1; MG/1; MG/1; MG/1
10 CAPSULE, EXTENDED RELEASE ORAL EVERY MORNING
Qty: 30 CAPSULE | Refills: 0 | Status: SHIPPED | OUTPATIENT
Start: 2021-03-01 | End: 2021-04-07 | Stop reason: SDUPTHER

## 2021-03-01 NOTE — TELEPHONE ENCOUNTER
Requested Prescriptions     Pending Prescriptions Disp Refills    amphetamine-dextroamphetamine (ADDERALL XR) 10 MG extended release capsule 30 capsule 0     Sig: Take 1 capsule by mouth every morning for 30 days.

## 2021-04-06 DIAGNOSIS — F90.0 ATTENTION DEFICIT HYPERACTIVITY DISORDER (ADHD), PREDOMINANTLY INATTENTIVE TYPE: ICD-10-CM

## 2021-04-07 RX ORDER — DEXTROAMPHETAMINE SACCHARATE, AMPHETAMINE ASPARTATE MONOHYDRATE, DEXTROAMPHETAMINE SULFATE AND AMPHETAMINE SULFATE 2.5; 2.5; 2.5; 2.5 MG/1; MG/1; MG/1; MG/1
10 CAPSULE, EXTENDED RELEASE ORAL EVERY MORNING
Qty: 30 CAPSULE | Refills: 0 | Status: SHIPPED | OUTPATIENT
Start: 2021-04-07 | End: 2021-05-03 | Stop reason: SDUPTHER

## 2021-04-12 PROBLEM — I69.951 HEMIPLEGIA OF RIGHT DOMINANT SIDE AS LATE EFFECT OF CEREBROVASCULAR DISEASE (HCC): Status: ACTIVE | Noted: 2018-07-17

## 2021-04-16 ENCOUNTER — OFFICE VISIT (OUTPATIENT)
Dept: NEUROLOGY | Age: 51
End: 2021-04-16
Payer: COMMERCIAL

## 2021-04-16 VITALS
SYSTOLIC BLOOD PRESSURE: 98 MMHG | WEIGHT: 214.5 LBS | DIASTOLIC BLOOD PRESSURE: 60 MMHG | BODY MASS INDEX: 34.62 KG/M2 | HEART RATE: 51 BPM

## 2021-04-16 DIAGNOSIS — Q21.12 PFO (PATENT FORAMEN OVALE): ICD-10-CM

## 2021-04-16 DIAGNOSIS — I69.351 HEMIPLEGIA OF RIGHT DOMINANT SIDE AS LATE EFFECT OF CEREBRAL INFARCTION, UNSPECIFIED HEMIPLEGIA TYPE (HCC): ICD-10-CM

## 2021-04-16 DIAGNOSIS — F90.0 ATTENTION DEFICIT HYPERACTIVITY DISORDER (ADHD), PREDOMINANTLY INATTENTIVE TYPE: ICD-10-CM

## 2021-04-16 DIAGNOSIS — I63.522 CEREBROVASCULAR ACCIDENT (CVA) DUE TO STENOSIS OF LEFT ANTERIOR CEREBRAL ARTERY (HCC): Primary | ICD-10-CM

## 2021-04-16 PROCEDURE — 99214 OFFICE O/P EST MOD 30 MIN: CPT | Performed by: PSYCHIATRY & NEUROLOGY

## 2021-04-16 ASSESSMENT — ENCOUNTER SYMPTOMS
NAUSEA: 0
CHOKING: 0
COLOR CHANGE: 0
VOMITING: 0
BACK PAIN: 0
PHOTOPHOBIA: 0
TROUBLE SWALLOWING: 0
SHORTNESS OF BREATH: 0

## 2021-04-16 NOTE — PROGRESS NOTES
session: Not on file    Stress: Not on file   Relationships    Social connections     Talks on phone: Not on file     Gets together: Not on file     Attends Holiness service: Not on file     Active member of club or organization: Not on file     Attends meetings of clubs or organizations: Not on file     Relationship status: Not on file    Intimate partner violence     Fear of current or ex partner: Not on file     Emotionally abused: Not on file     Physically abused: Not on file     Forced sexual activity: Not on file   Other Topics Concern    Not on file   Social History Narrative    Not on file     No family history on file. No Known Allergies    Current Outpatient Medications   Medication Sig Dispense Refill    amphetamine-dextroamphetamine (ADDERALL XR) 10 MG extended release capsule Take 1 capsule by mouth every morning for 30 days. 30 capsule 0    aspirin 325 MG EC tablet Take 325 mg by mouth daily      meclizine (ANTIVERT) 25 MG tablet Take 1 tablet by mouth 3 times daily as needed for Dizziness 90 tablet 6    atorvastatin (LIPITOR) 40 MG tablet TAKE ONE TABLET BY MOUTH EVERY DAY 30 tablet 5    albuterol sulfate  (90 Base) MCG/ACT inhaler Inhale into the lungs      clopidogrel (PLAVIX) 75 MG tablet Take 75 mg by mouth daily      Cholecalciferol (VITAMIN D3) 91329 units CAPS Take 50,000 Units by mouth once a week      ALPRAZolam (XANAX) 0.5 MG tablet Take 0.5 mg by mouth every 4 hours as needed for Anxiety. No current facility-administered medications for this visit. Review of Systems   Constitutional: Negative for fever. HENT: Negative for ear pain, tinnitus and trouble swallowing. Eyes: Negative for photophobia and visual disturbance. Respiratory: Negative for choking and shortness of breath. Cardiovascular: Negative for chest pain and palpitations. Gastrointestinal: Negative for nausea and vomiting.    Musculoskeletal: Negative for back pain, gait problem, joint swelling, myalgias, neck pain and neck stiffness. Skin: Negative for color change. Allergic/Immunologic: Negative for food allergies. Neurological: Negative for dizziness, tremors, seizures, syncope, facial asymmetry, speech difficulty, weakness, light-headedness, numbness and headaches. Psychiatric/Behavioral: Negative for behavioral problems, confusion, hallucinations and sleep disturbance. Objective:   BP 98/60 (Site: Left Upper Arm, Position: Sitting, Cuff Size: Large Adult)   Pulse 51   Wt 214 lb 8 oz (97.3 kg)   BMI 34.62 kg/m²     Physical Exam  Vitals signs reviewed. Eyes:      Pupils: Pupils are equal, round, and reactive to light. Neck:      Musculoskeletal: Normal range of motion. Cardiovascular:      Rate and Rhythm: Normal rate and regular rhythm. Heart sounds: No murmur. Pulmonary:      Effort: Pulmonary effort is normal.      Breath sounds: Normal breath sounds. Abdominal:      General: Bowel sounds are normal.   Musculoskeletal: Normal range of motion. Skin:     General: Skin is warm. Neurological:      Mental Status: He is alert and oriented to person, place, and time. Cranial Nerves: No cranial nerve deficit. Sensory: No sensory deficit. Motor: No abnormal muscle tone. Coordination: Coordination normal.      Deep Tendon Reflexes: Reflexes are normal and symmetric. Babinski sign absent on the right side. Babinski sign absent on the left side.    Psychiatric:         Mood and Affect: Mood normal.         Echo Limited    Result Date: 6/12/2020  Transthoracic Echocardiography Report (TTE)  Demographics  Patient Name   Paresh Dumont Gender              Male                 G  Patient Number [id number]         Race                                                  Ethnicity  Visit Number   [id number]        Room Number         OP  Corporate ID                    Date of Study       06/12/2020  Accession      [id number]       Referring Physician Kavita Osborn., DO  Number  Date of Birth  1970       Sonographer         Jemma Ramirez RCS  Age            51 year(s)       Interpreting        [facility])                                  Physician           Cardiology                                                      Ahmed Dorita Eisenmenger Procedure Type of Study  TTE procedure:ECHOCARDIOGRAM LIMITED WITH BUBBLE STUDY. Procedure Date Date: 06/12/2020 Start: 08:38 AM Study Location: Echo Lab Technical Quality: Adequate visualization Indications:Patent foramen ovale (PFO). Patient Status: Routine Contrast Medium: Bubble Study.  Height: 72 inches Weight: 213 pounds BSA: 2.19 m^2 BMI: 28.89 kg/m^2 BP: 134/78 mmHg  Conclusions  Summary  There was no evidence of spontaneous echo contrast.  S/P PFO closure  Signature  ----------------------------------------------------------------  Electronically signed by Maru Ortega(Interpreting physician)  on 06/12/2020 04:57 PM  ----------------------------------------------------------------  Findings Left At[facility] There was no evidence of spontaneous echo contrast. S/P PFO closure      Lab Results   Component Value Date    WBC 10.6 03/06/2020    WBC 7.5 02/27/2020    RBC 4.97 03/06/2020    RBC 4.62 07/18/2018    HGB 14.3 03/06/2020    HCT 43.0 03/06/2020    MCV 87 03/06/2020    MCH 29.3 02/27/2020    MCHC 33.3 03/06/2020    RDW 13.3 02/27/2020     03/06/2020    MPV 11.1 07/18/2018     Lab Results   Component Value Date     03/06/2020    K 4.7 03/06/2020     03/06/2020    CO2 23 02/27/2020    BUN 16 02/27/2020    CREATININE 1.06 03/06/2020    GFRAA >60 03/06/2020    LABGLOM >60 03/06/2020    GLUCOSE 103 03/06/2020    PROT 7.0 02/27/2020    LABALBU 4.1 02/27/2020    CALCIUM 9.1 03/06/2020    BILITOT 0.3 02/27/2020    ALKPHOS 66 02/27/2020    AST 21 02/27/2020    ALT 22 02/27/2020     Lab Results   Component Value Date    PROTIME 11.4 03/05/2020    INR 1.0 03/05/2020     No results found for: TSH, [id number],

## 2021-04-27 ENCOUNTER — OFFICE VISIT (OUTPATIENT)
Dept: CARDIOLOGY CLINIC | Age: 51
End: 2021-04-27
Payer: COMMERCIAL

## 2021-04-27 VITALS
DIASTOLIC BLOOD PRESSURE: 70 MMHG | BODY MASS INDEX: 34.54 KG/M2 | WEIGHT: 214 LBS | SYSTOLIC BLOOD PRESSURE: 118 MMHG | HEART RATE: 55 BPM | OXYGEN SATURATION: 99 %

## 2021-04-27 DIAGNOSIS — Z72.0 TOBACCO USE: ICD-10-CM

## 2021-04-27 DIAGNOSIS — Q21.12 PFO (PATENT FORAMEN OVALE): ICD-10-CM

## 2021-04-27 DIAGNOSIS — E78.2 MIXED HYPERLIPIDEMIA: ICD-10-CM

## 2021-04-27 DIAGNOSIS — I25.10 CORONARY ARTERY DISEASE INVOLVING NATIVE CORONARY ARTERY OF NATIVE HEART WITHOUT ANGINA PECTORIS: Primary | ICD-10-CM

## 2021-04-27 DIAGNOSIS — I10 ESSENTIAL (PRIMARY) HYPERTENSION: ICD-10-CM

## 2021-04-27 PROCEDURE — 99214 OFFICE O/P EST MOD 30 MIN: CPT | Performed by: INTERNAL MEDICINE

## 2021-04-27 RX ORDER — MULTIVITAMIN
TABLET ORAL DAILY
COMMUNITY
End: 2022-03-03 | Stop reason: ALTCHOICE

## 2021-04-27 ASSESSMENT — ENCOUNTER SYMPTOMS
VOMITING: 0
GASTROINTESTINAL NEGATIVE: 1
WHEEZING: 0
EYES NEGATIVE: 1
ABDOMINAL PAIN: 0
NAUSEA: 0
SHORTNESS OF BREATH: 0

## 2021-04-27 NOTE — PROGRESS NOTES
2021  CC: CAD     HPI:    Marni Wiseman (:  1970) has requested an audio/video evaluation for the following concern(s): PFO    S/p PCI of mid LAD. S/p PFO closure with [place] Cardioform. Pt denies chest pain, dyspnea, dyspnea on exertion, change in exercise capacity, fatigue,  nausea, vomiting, diarrhea, constipation, motor weakness, insomnia, weight loss, syncope, dizziness, lightheadedness, palpitations, PND, orthopnea, or claudication. No nitro use. BP and hr are good. CAD is stable. No LE discoloration or ulcers. No LE edema. No CHF type symptoms. Lipid profile is normal. No recent hospitalization. No change in meds. On DAPT. No bleeding issues. Has had some chest fluttering at times 1-2xday. No neuro type symptoms. 10/22/2020: Patient has been experiencing increased fatigue tiredness for the past couple weeks. He has been stressed out and working more. With history of coronary disease status post mid LAD PCI and PFO closure in 2020. Patient also complains of lightheadedness/dizziness. Blood pressure is 112/84 heart rate of 58 bpm.  Patient is on aspirin and Plavix. He is also on a statin. Status post limited echo in 2020 with no evidence of PFO. That is post 48-hour Holter monitor with which was essentially negative and normal.  EKG with sinus bradycardia today at heart rate of 50 bpm, no ischemia. States he is having some memory issues. 21:  Developed LH/dizz at times. With history of coronary disease status post mid LAD PCI and PFO closure in 2020. Pt denies chest pain, dyspnea, dyspnea on exertion, change in exercise capacity, fatigue,  nausea, vomiting, diarrhea, constipation, motor weakness, insomnia, weight loss, syncope, dizziness, lightheadedness, palpitations, PND, orthopnea, or claudication. No nitro use. BP and hr are good. CAD is stable. No LE discoloration or ulcers. No LE edema. No CHF type symptoms.  Lipid profile is normal. No recent hospitalization. No change in meds. Review of Systems   Constitutional: Negative. Negative for chills and fever. Eyes: Negative. Respiratory: Negative for shortness of breath and wheezing. Cardiovascular: Negative for chest pain, palpitations and leg swelling. Gastrointestinal: Negative. Negative for abdominal pain, nausea and vomiting. Skin: Negative. Negative for rash. Neurological: Negative for dizziness, weakness and headaches. Prior to Visit Medications    Medication Sig Taking? Authorizing Provider   Multiple Vitamin (MULTI VITAMIN DAILY) TABS Take by mouth daily Yes Historical Provider, MD   amphetamine-dextroamphetamine (ADDERALL XR) 10 MG extended release capsule Take 1 capsule by mouth every morning for 30 days. Yes Maxwell Erazo MD   aspirin 325 MG EC tablet Take 325 mg by mouth daily Yes Historical Provider, MD   meclizine (ANTIVERT) 25 MG tablet Take 1 tablet by mouth 3 times daily as needed for Dizziness Yes Kirt FLEMING Holiday, DO   atorvastatin (LIPITOR) 40 MG tablet TAKE ONE TABLET BY MOUTH EVERY DAY Yes Kirt BERNADETTE Holiday, DO   albuterol sulfate  (90 Base) MCG/ACT inhaler Inhale into the lungs Yes Historical Provider, MD   ALPRAZolam (XANAX) 0.5 MG tablet Take 0.5 mg by mouth every 4 hours as needed for Anxiety.   Yes Historical Provider, MD       Social History     Tobacco Use    Smoking status: Current Every Day Smoker     Types: Cigars    Smokeless tobacco: Never Used   Substance Use Topics    Alcohol use: Not Currently    Drug use: Not Currently        No Known Allergies,   Past Medical History:   Diagnosis Date    Anxiety     Coronary artery disease involving native coronary artery of native heart without angina pectoris 10/22/2020    CVA [facility]cerebral vascular accident) [facility] [address]     Hyperlipidemia     WOOD (obstructive sleep apnea)     PFO (patent foramen ovale)    ,   Past Surgical History:   Procedure Laterality Date    DIAGNOSTIC CARDIAC CATH LAB PROCEDURE 02/27/2020    PERIPHERAL PERCUTANEOUS ARTERIAL INTERVENTION  02/27/2020   , No family history on file. Physical Examination:  General appearance - alert, well appearing, and in no distress and overweight  Mental status - alert, oriented to person, place, and time  Neck - Neck is supple, no JVD or carotid bruits. No thyromegaly or adenopathy. Chest - clear to auscultation, no wheezes, rales or rhonchi, symmetric air entry  Heart - normal rate, regular rhythm, normal S1, S2, no murmurs, rubs, clicks or gallops  Abdomen - soft, nontender, nondistended, no masses or organomegaly  Neurological - alert, oriented, normal speech, no focal findings or movement disorder noted  Extremities - peripheral pulses normal, no pedal edema, no clubbing or cyanosis  Skin - normal coloration and turgor, no rashes, no suspicious skin lesions noted    ASSESSMENT:        Diagnosis Orders   1. Coronary artery disease involving native coronary artery of native heart without angina pectoris     2. Essential (primary) hypertension     3. Tobacco use     4. PFO (patent foramen ovale)     5. Mixed hyperlipidemia         PLAN:      Ok to stop Plavix, cont with ASA. 81mg daily is ok with me, will discuss with neuro. Follow-up with neurology, patient has an appointment soon. Patient was advised and encouraged to check blood pressure at home or at a pharmacy, maintain a logbook, and also call us back if blood pressure are above the target ranges or if it is low. Patient clearly understands and agrees to the instructions. We will need to continue to monitor muscle and liver enzymes, BUN, CR, and electrolytes. The nature of cardiac risk has been fully discussed with this patient. I have made him aware of his LDL target goal given his cardiovascular risk analysis. I have discussed the appropriate diet. The need for lifelong compliance in order to reduce risk is stressed.  A regular exercise program is recommended to help achieve and maintain normal body weight, fitness and improve lipid balance. No follow-ups on file. An  electronic signature was used to authenticate this note.     --Andrey Peterson DO on 10/22/2020 at 12:16 PM

## 2021-05-03 DIAGNOSIS — E78.2 MIXED HYPERLIPIDEMIA: Primary | ICD-10-CM

## 2021-05-03 DIAGNOSIS — F90.0 ATTENTION DEFICIT HYPERACTIVITY DISORDER (ADHD), PREDOMINANTLY INATTENTIVE TYPE: ICD-10-CM

## 2021-05-03 RX ORDER — ATORVASTATIN CALCIUM 40 MG/1
TABLET, FILM COATED ORAL
Qty: 90 TABLET | Refills: 2 | Status: SHIPPED | OUTPATIENT
Start: 2021-05-03 | End: 2022-02-11 | Stop reason: SDUPTHER

## 2021-05-03 RX ORDER — DEXTROAMPHETAMINE SACCHARATE, AMPHETAMINE ASPARTATE MONOHYDRATE, DEXTROAMPHETAMINE SULFATE AND AMPHETAMINE SULFATE 2.5; 2.5; 2.5; 2.5 MG/1; MG/1; MG/1; MG/1
10 CAPSULE, EXTENDED RELEASE ORAL EVERY MORNING
Qty: 30 CAPSULE | Refills: 0 | Status: SHIPPED | OUTPATIENT
Start: 2021-05-03 | End: 2021-06-01 | Stop reason: SDUPTHER

## 2021-05-03 NOTE — TELEPHONE ENCOUNTER
Giovany Castellanos requesting medication refill.  Please approve or deny this request.    Rx requested:  Requested Prescriptions     Pending Prescriptions Disp Refills    atorvastatin (LIPITOR) 40 MG tablet [Pharmacy Med Name: Atorvastatin Calcium Oral Tablet 40 MG] 30 tablet 0     Sig: TAKE ONE TABLET BY MOUTH EVERY DAY         Last Office Visit:   4/27/2021      Next Visit Date:  Future Appointments   Date Time Provider Faustino Potter   10/15/2021 10:00 AM Beth Salas MD [address] [place] [address]   12/16/2021  1:00 PM Kirt Parra [facility]

## 2021-06-01 DIAGNOSIS — F90.0 ATTENTION DEFICIT HYPERACTIVITY DISORDER (ADHD), PREDOMINANTLY INATTENTIVE TYPE: ICD-10-CM

## 2021-06-01 RX ORDER — DEXTROAMPHETAMINE SACCHARATE, AMPHETAMINE ASPARTATE MONOHYDRATE, DEXTROAMPHETAMINE SULFATE AND AMPHETAMINE SULFATE 2.5; 2.5; 2.5; 2.5 MG/1; MG/1; MG/1; MG/1
10 CAPSULE, EXTENDED RELEASE ORAL EVERY MORNING
Qty: 30 CAPSULE | Refills: 0 | Status: SHIPPED | OUTPATIENT
Start: 2021-06-01 | End: 2021-07-06 | Stop reason: SDUPTHER

## 2021-07-06 ENCOUNTER — TELEPHONE (OUTPATIENT)
Dept: NEUROLOGY | Age: 51
End: 2021-07-06

## 2021-07-06 DIAGNOSIS — F90.0 ATTENTION DEFICIT HYPERACTIVITY DISORDER (ADHD), PREDOMINANTLY INATTENTIVE TYPE: ICD-10-CM

## 2021-07-06 RX ORDER — DEXTROAMPHETAMINE SACCHARATE, AMPHETAMINE ASPARTATE MONOHYDRATE, DEXTROAMPHETAMINE SULFATE AND AMPHETAMINE SULFATE 2.5; 2.5; 2.5; 2.5 MG/1; MG/1; MG/1; MG/1
10 CAPSULE, EXTENDED RELEASE ORAL EVERY MORNING
Qty: 30 CAPSULE | Refills: 0 | Status: SHIPPED | OUTPATIENT
Start: 2021-07-06 | End: 2021-08-06 | Stop reason: SDUPTHER

## 2021-08-05 DIAGNOSIS — F90.0 ATTENTION DEFICIT HYPERACTIVITY DISORDER (ADHD), PREDOMINANTLY INATTENTIVE TYPE: ICD-10-CM

## 2021-08-05 NOTE — TELEPHONE ENCOUNTER
Patient is requesting medication refill. Please approve or deny this request.    Rx requested:  Requested Prescriptions     Pending Prescriptions Disp Refills    amphetamine-dextroamphetamine (ADDERALL XR) 10 MG extended release capsule 30 capsule 0     Sig: Take 1 capsule by mouth every morning for 30 days.          Last Office Visit:   4/16/2021      Next Visit Date:  Future Appointments   Date Time Provider Faustino Potter   10/15/2021 10:00 AM Sharen Kawasaki, MD [address] [place] [address]   12/16/2021  1:00 PM Kirt Walter [facility]

## 2021-08-06 RX ORDER — DEXTROAMPHETAMINE SACCHARATE, AMPHETAMINE ASPARTATE MONOHYDRATE, DEXTROAMPHETAMINE SULFATE AND AMPHETAMINE SULFATE 2.5; 2.5; 2.5; 2.5 MG/1; MG/1; MG/1; MG/1
10 CAPSULE, EXTENDED RELEASE ORAL EVERY MORNING
Qty: 30 CAPSULE | Refills: 0 | Status: SHIPPED | OUTPATIENT
Start: 2021-08-06 | End: 2021-09-07 | Stop reason: SDUPTHER

## 2021-09-07 DIAGNOSIS — F90.0 ATTENTION DEFICIT HYPERACTIVITY DISORDER (ADHD), PREDOMINANTLY INATTENTIVE TYPE: ICD-10-CM

## 2021-09-07 RX ORDER — DEXTROAMPHETAMINE SACCHARATE, AMPHETAMINE ASPARTATE MONOHYDRATE, DEXTROAMPHETAMINE SULFATE AND AMPHETAMINE SULFATE 2.5; 2.5; 2.5; 2.5 MG/1; MG/1; MG/1; MG/1
10 CAPSULE, EXTENDED RELEASE ORAL EVERY MORNING
Qty: 30 CAPSULE | Refills: 0 | Status: SHIPPED | OUTPATIENT
Start: 2021-09-07 | End: 2021-10-07 | Stop reason: SDUPTHER

## 2021-10-07 DIAGNOSIS — F90.0 ATTENTION DEFICIT HYPERACTIVITY DISORDER (ADHD), PREDOMINANTLY INATTENTIVE TYPE: ICD-10-CM

## 2021-10-07 RX ORDER — DEXTROAMPHETAMINE SACCHARATE, AMPHETAMINE ASPARTATE MONOHYDRATE, DEXTROAMPHETAMINE SULFATE AND AMPHETAMINE SULFATE 2.5; 2.5; 2.5; 2.5 MG/1; MG/1; MG/1; MG/1
10 CAPSULE, EXTENDED RELEASE ORAL EVERY MORNING
Qty: 30 CAPSULE | Refills: 0 | Status: SHIPPED | OUTPATIENT
Start: 2021-10-07 | End: 2021-11-10 | Stop reason: SDUPTHER

## 2021-10-07 NOTE — TELEPHONE ENCOUNTER
Patients wife is requesting medication refill. Please approve or deny this request.    Rx requested:  Requested Prescriptions     Pending Prescriptions Disp Refills    amphetamine-dextroamphetamine (ADDERALL XR) 10 MG extended release capsule 30 capsule 0     Sig: Take 1 capsule by mouth every morning for 30 days.          Last Office Visit:   4/16/2021      Next Visit Date:  Future Appointments   Date Time Provider Faustino Potter   10/15/2021 10:00 AM Zainab Younger MD [address] [place] [address]   12/16/2021  1:00 PM Kirt Magaña [facility]

## 2021-10-12 PROBLEM — F32.9 MAJOR DEPRESSIVE DISORDER, SINGLE EPISODE, UNSPECIFIED: Status: ACTIVE | Noted: 2018-12-05

## 2021-10-12 PROBLEM — F12.90 MARIJUANA USER: Status: ACTIVE | Noted: 2021-10-12

## 2021-11-10 DIAGNOSIS — F90.0 ATTENTION DEFICIT HYPERACTIVITY DISORDER (ADHD), PREDOMINANTLY INATTENTIVE TYPE: ICD-10-CM

## 2021-11-10 RX ORDER — DEXTROAMPHETAMINE SACCHARATE, AMPHETAMINE ASPARTATE MONOHYDRATE, DEXTROAMPHETAMINE SULFATE AND AMPHETAMINE SULFATE 2.5; 2.5; 2.5; 2.5 MG/1; MG/1; MG/1; MG/1
10 CAPSULE, EXTENDED RELEASE ORAL EVERY MORNING
Qty: 30 CAPSULE | Refills: 0 | Status: SHIPPED | OUTPATIENT
Start: 2021-11-10 | End: 2021-12-13 | Stop reason: SDUPTHER

## 2021-12-13 DIAGNOSIS — F90.0 ATTENTION DEFICIT HYPERACTIVITY DISORDER (ADHD), PREDOMINANTLY INATTENTIVE TYPE: ICD-10-CM

## 2021-12-13 RX ORDER — DEXTROAMPHETAMINE SACCHARATE, AMPHETAMINE ASPARTATE MONOHYDRATE, DEXTROAMPHETAMINE SULFATE AND AMPHETAMINE SULFATE 2.5; 2.5; 2.5; 2.5 MG/1; MG/1; MG/1; MG/1
10 CAPSULE, EXTENDED RELEASE ORAL EVERY MORNING
Qty: 30 CAPSULE | Refills: 0 | Status: SHIPPED | OUTPATIENT
Start: 2021-12-13 | End: 2022-01-06 | Stop reason: SDUPTHER

## 2022-01-06 DIAGNOSIS — F90.0 ATTENTION DEFICIT HYPERACTIVITY DISORDER (ADHD), PREDOMINANTLY INATTENTIVE TYPE: ICD-10-CM

## 2022-01-06 RX ORDER — DEXTROAMPHETAMINE SACCHARATE, AMPHETAMINE ASPARTATE MONOHYDRATE, DEXTROAMPHETAMINE SULFATE AND AMPHETAMINE SULFATE 2.5; 2.5; 2.5; 2.5 MG/1; MG/1; MG/1; MG/1
10 CAPSULE, EXTENDED RELEASE ORAL EVERY MORNING
Qty: 30 CAPSULE | Refills: 0 | Status: CANCELLED | OUTPATIENT
Start: 2022-01-06 | End: 2022-02-05

## 2022-01-06 RX ORDER — DEXTROAMPHETAMINE SACCHARATE, AMPHETAMINE ASPARTATE MONOHYDRATE, DEXTROAMPHETAMINE SULFATE AND AMPHETAMINE SULFATE 2.5; 2.5; 2.5; 2.5 MG/1; MG/1; MG/1; MG/1
10 CAPSULE, EXTENDED RELEASE ORAL EVERY MORNING
Qty: 30 CAPSULE | Refills: 0 | Status: SHIPPED | OUTPATIENT
Start: 2022-01-06 | End: 2022-02-09 | Stop reason: SDUPTHER

## 2022-02-09 DIAGNOSIS — F90.0 ATTENTION DEFICIT HYPERACTIVITY DISORDER (ADHD), PREDOMINANTLY INATTENTIVE TYPE: ICD-10-CM

## 2022-02-09 RX ORDER — DEXTROAMPHETAMINE SACCHARATE, AMPHETAMINE ASPARTATE MONOHYDRATE, DEXTROAMPHETAMINE SULFATE AND AMPHETAMINE SULFATE 2.5; 2.5; 2.5; 2.5 MG/1; MG/1; MG/1; MG/1
10 CAPSULE, EXTENDED RELEASE ORAL EVERY MORNING
Qty: 30 CAPSULE | Refills: 0 | Status: SHIPPED | OUTPATIENT
Start: 2022-02-09 | End: 2022-03-20 | Stop reason: SDUPTHER

## 2022-02-11 ENCOUNTER — OFFICE VISIT (OUTPATIENT)
Dept: CARDIOLOGY CLINIC | Age: 52
End: 2022-02-11
Payer: COMMERCIAL

## 2022-02-11 VITALS
BODY MASS INDEX: 32.3 KG/M2 | HEART RATE: 79 BPM | SYSTOLIC BLOOD PRESSURE: 120 MMHG | WEIGHT: 201 LBS | HEIGHT: 66 IN | DIASTOLIC BLOOD PRESSURE: 82 MMHG | OXYGEN SATURATION: 99 %

## 2022-02-11 DIAGNOSIS — R06.02 SHORTNESS OF BREATH: ICD-10-CM

## 2022-02-11 DIAGNOSIS — I10 ESSENTIAL (PRIMARY) HYPERTENSION: Primary | ICD-10-CM

## 2022-02-11 DIAGNOSIS — E78.2 MIXED HYPERLIPIDEMIA: ICD-10-CM

## 2022-02-11 DIAGNOSIS — I25.10 CORONARY ARTERY DISEASE INVOLVING NATIVE CORONARY ARTERY OF NATIVE HEART WITHOUT ANGINA PECTORIS: ICD-10-CM

## 2022-02-11 PROCEDURE — 99214 OFFICE O/P EST MOD 30 MIN: CPT | Performed by: INTERNAL MEDICINE

## 2022-02-11 PROCEDURE — 93000 ELECTROCARDIOGRAM COMPLETE: CPT | Performed by: INTERNAL MEDICINE

## 2022-02-11 RX ORDER — ATORVASTATIN CALCIUM 40 MG/1
TABLET, FILM COATED ORAL
Qty: 90 TABLET | Refills: 3 | Status: SHIPPED | OUTPATIENT
Start: 2022-02-11

## 2022-02-11 ASSESSMENT — ENCOUNTER SYMPTOMS
WHEEZING: 0
VOMITING: 0
EYES NEGATIVE: 1
NAUSEA: 0
SHORTNESS OF BREATH: 0
GASTROINTESTINAL NEGATIVE: 1
ABDOMINAL PAIN: 0

## 2022-02-11 NOTE — PROGRESS NOTES
2022  CC: CAD     HPI:    Alba Fleischer (:  1970) has requested an audio/video evaluation for the following concern(s): PFO    S/p PCI of mid LAD. S/p PFO closure with [place] Cardioform. Pt denies chest pain, dyspnea, dyspnea on exertion, change in exercise capacity, fatigue,  nausea, vomiting, diarrhea, constipation, motor weakness, insomnia, weight loss, syncope, dizziness, lightheadedness, palpitations, PND, orthopnea, or claudication. No nitro use. BP and hr are good. CAD is stable. No LE discoloration or ulcers. No LE edema. No CHF type symptoms. Lipid profile is normal. No recent hospitalization. No change in meds. On DAPT. No bleeding issues. Has had some chest fluttering at times 1-2xday. No neuro type symptoms. 10/22/2020: Patient has been experiencing increased fatigue tiredness for the past couple weeks. He has been stressed out and working more. With history of coronary disease status post mid LAD PCI and PFO closure in 2020. Patient also complains of lightheadedness/dizziness. Blood pressure is 112/84 heart rate of 58 bpm.  Patient is on aspirin and Plavix. He is also on a statin. Status post limited echo in 2020 with no evidence of PFO. That is post 48-hour Holter monitor with which was essentially negative and normal.  EKG with sinus bradycardia today at heart rate of 50 bpm, no ischemia. States he is having some memory issues. 21:  Developed LH/dizz at times. With history of coronary disease status post mid LAD PCI and PFO closure in 2020. Pt denies chest pain, dyspnea, dyspnea on exertion, change in exercise capacity, fatigue,  nausea, vomiting, diarrhea, constipation, motor weakness, insomnia, weight loss, syncope, dizziness, lightheadedness, palpitations, PND, orthopnea, or claudication. No nitro use. BP and hr are good. CAD is stable. No LE discoloration or ulcers. No LE edema. No CHF type symptoms.  Lipid profile is normal. No recent hospitalization. No change in meds. 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 Pt denies chest pain,,  nausea, vomiting, diarrhea, constipation, motor weakness, insomnia, weight loss, syncope, dizziness, lightheadedness, palpitations, PND, orthopnea, or claudication. Hx of CAD s/p PCI of LAD, hx of PFO closure. Has lost weight overall. Review of Systems   Constitutional: Negative. Negative for chills and fever. Eyes: Negative. Respiratory: Negative for shortness of breath and wheezing. Cardiovascular: Negative for chest pain, palpitations and leg swelling. Gastrointestinal: Negative. Negative for abdominal pain, nausea and vomiting. Skin: Negative. Negative for rash. Neurological: Negative for dizziness, weakness and headaches. Prior to Visit Medications    Medication Sig Taking? Authorizing Provider   atorvastatin (LIPITOR) 40 MG tablet TAKE ONE TABLET BY MOUTH EVERY DAY Yes Kirt Canseco, DO   amphetamine-dextroamphetamine (ADDERALL XR) 10 MG extended release capsule Take 1 capsule by mouth every morning for 30 days. Yes Stephen Moncada MD   Multiple Vitamin (MULTI VITAMIN DAILY) TABS Take by mouth daily Yes Historical Provider, MD   aspirin 325 MG EC tablet Take 325 mg by mouth daily Yes Historical Provider, MD   meclizine (ANTIVERT) 25 MG tablet Take 1 tablet by mouth 3 times daily as needed for Dizziness Yes Kirt Canseco, DO   albuterol sulfate  (90 Base) MCG/ACT inhaler Inhale into the lungs Yes Historical Provider, MD   ALPRAZolam Donzella Sans) 0.5 MG tablet Take 0.5 mg by mouth every 4 hours as needed for Anxiety.   Yes Historical Provider, MD       Social History     Tobacco Use    Smoking status: Current Every Day Smoker     Types: Cigars    Smokeless tobacco: Never Used   Substance Use Topics    Alcohol use: Not Currently    Drug use: Not Currently        No Known Allergies,   Past Medical History: Diagnosis Date    Anxiety     Coronary artery disease involving native coronary artery of native heart without angina pectoris 10/22/2020    [facility]A [facility]cerebral vascular accident) ([facility] [address]     Hyperlipidemia     WOOD (obstructive sleep apnea)     PFO (patent foramen ovale)    ,   Past Surgical History:   Procedure Laterality Date    DIAGNOSTIC CARDIAC CATH LAB PROCEDURE  02/27/2020    PERIPHERAL PERCUTANEOUS ARTERIAL INTERVENTION  02/27/2020   , No family history on file. Physical Examination:  General appearance - alert, well appearing, and in no distress and overweight  Mental status - alert, oriented to person, place, and time  Neck - Neck is supple, no JVD or carotid bruits. No thyromegaly or adenopathy. Chest - clear to auscultation, no wheezes, rales or rhonchi, symmetric air entry  Heart - normal rate, regular rhythm, normal S1, S2, no murmurs, rubs, clicks or gallops  Abdomen - soft, nontender, nondistended, no masses or organomegaly  Neurological - alert, oriented, normal speech, no focal findings or movement disorder noted  Extremities - peripheral pulses normal, no pedal edema, no clubbing or cyanosis  Skin - normal coloration and turgor, no rashes, no suspicious skin lesions noted    ASSESSMENT:        Diagnosis Orders   1. Essential (primary) hypertension     2. Coronary artery disease involving native coronary artery of native heart without angina pectoris  EKG 12 lead   3. Mixed hyperlipidemia  atorvastatin (LIPITOR) 40 MG tablet   4. Shortness of breath  ECHO Complete 2D W Doppler W Color    NM MYOCARDIAL SPECT REST EXERCISE OR RX       PLAN:       cont with ASA. Follow-up with neurology, patient has an appointment soon. Non invasive cardiac testing will be ordered to further evaluate for any ischemic or structural heart disease as a cause of the patient symptoms. We will proceed with a Cardiolite stress test and echo.       Patient was advised and encouraged to check blood pressure at home or at a pharmacy, maintain a logbook, and also call us back if blood pressure are above the target ranges or if it is low. Patient clearly understands and agrees to the instructions. We will need to continue to monitor muscle and liver enzymes, BUN, CR, and electrolytes. The nature of cardiac risk has been fully discussed with this patient. I have made him aware of his LDL target goal given his cardiovascular risk analysis. I have discussed the appropriate diet. The need for lifelong compliance in order to reduce risk is stressed. A regular exercise program is recommended to help achieve and maintain normal body weight, fitness and improve lipid balance. No follow-ups on file. An  electronic signature was used to authenticate this note.     --Raúl Romano,  on 10/22/2020 at 12:16 PM

## 2022-02-28 ENCOUNTER — HOSPITAL ENCOUNTER (OUTPATIENT)
Dept: NUCLEAR MEDICINE | Age: 52
Discharge: HOME | End: 2022-03-02
Payer: COMMERCIAL

## 2022-02-28 ENCOUNTER — HOSPITAL ENCOUNTER (OUTPATIENT)
Dept: NON INVASIVE DIAGNOSTICS | Age: 52
Discharge: HOME | End: 2022-02-28
Payer: COMMERCIAL

## 2022-02-28 DIAGNOSIS — R06.02 SHORTNESS OF BREATH: ICD-10-CM

## 2022-02-28 LAB
LV EF: 65 %
LVEF MODALITY: NORMAL

## 2022-02-28 PROCEDURE — 93306 TTE W/DOPPLER COMPLETE: CPT

## 2022-02-28 PROCEDURE — 93017 CV STRESS TEST TRACING ONLY: CPT

## 2022-02-28 PROCEDURE — A9502 TC99M TETROFOSMIN: HCPCS | Performed by: INTERNAL MEDICINE

## 2022-02-28 PROCEDURE — 3430000000 HC RX DIAGNOSTIC RADIOPHARMACEUTICAL: Performed by: INTERNAL MEDICINE

## 2022-02-28 PROCEDURE — 2580000003 HC RX 258: Performed by: INTERNAL MEDICINE

## 2022-02-28 PROCEDURE — 78452 HT MUSCLE IMAGE SPECT MULT: CPT

## 2022-02-28 RX ORDER — SODIUM CHLORIDE 0.9 % (FLUSH) 0.9 %
10 SYRINGE (ML) INJECTION PRN
Status: COMPLETED | OUTPATIENT
Start: 2022-02-28 | End: 2022-02-28

## 2022-02-28 RX ADMIN — Medication 10 ML: at 10:10

## 2022-02-28 RX ADMIN — TETROFOSMIN 11.3 MILLICURIE: 1.38 INJECTION, POWDER, LYOPHILIZED, FOR SOLUTION INTRAVENOUS at 08:26

## 2022-02-28 RX ADMIN — TETROFOSMIN 32 MILLICURIE: 1.38 INJECTION, POWDER, LYOPHILIZED, FOR SOLUTION INTRAVENOUS at 10:06

## 2022-02-28 RX ADMIN — Medication 10 ML: at 10:09

## 2022-02-28 RX ADMIN — Medication 10 ML: at 08:26

## 2022-02-28 NOTE — PROGRESS NOTES
Hx,allergies and medications reviewed. Patient took his home medications prior to testing. Garrett Macedo 148 here. Injected patient with isotope and Myoview. Tolerated procedure well for 11:00 minutes. Reached 91% target HR. SOB reported. Returned to baseline in recovery. Denied chest pain or pressure. EKG shows no noted ectopy.

## 2022-03-02 NOTE — PROCEDURES
[address]                      [address], [address] [facility]                              CARDIAC STRESS TEST    PATIENT NAME: Savita Johnson                 :        1970  MED REC NO:   [id number]                            ROOM:  ACCOUNT NO:   [de-identified]                           ADMIT DATE: 2022  PROVIDER:     Ike Canseco DO    CARDIOVASCULAR DIAGNOSTIC DEPARTMENT    DATE OF STUDY:  2022    ONE-DAY TREADMILL MYOCARDIAL PERFUSION STRESS TEST    ORDERING PROVIDER:  Ike Canseco DO    PRIMARY CARE PROVIDER:  Thu Jolly MD    EXAM TYPE:  Stress Myocardial Perfusion Treadmill, 1-day. REASON FOR EXAM:  Shortness of breath. PROCEDURE DESCRIPTION:  The patient was injected  intravenously at rest  with technetium-99m tetrofosmin followed by resting SPECT myocardial  perfusion imaging and then underwent stress protocol on a treadmill. The patient was then injected intravenously at maximal exercise with  technetium-99m tetrofosmin and SPECT myocardial perfusion and gated  imaging were repeated. Rest dose:  11.2 mCi  Stress dose:  33.0 mCi    EXERCISE DATA:  Exercise protocol:  Resting HR:  69 bpm  Resting BP:  129/68 mmHg  Total test time:  11 minutes  MPHR:  91%  Maximum workload:  13.4 mets of activity  Maximum HR:  155 bpm  Maximum BP:  172/80 mmHg    FINDINGS:  The stress and rest images exhibit homogeneous uptake of  tracer throughout the left ventricular myocardium. There is no evidence  of stress-induced reversible perfusion abnormalities to suggest  myocardial ischemia. Gated imaging exhibits normal left ventricular  size and normal wall motion and myocardial thickening. The patient did  not develop any symptoms other than fatigue during the procedure. EKG  analysis did not demonstrate ST-segment changes nor arrhythmias  concerning for myocardial ischemia. LVEF:  63%  TID ratio:  1.09    IMPRESSION:  1.   No evidence of stress-induced myocardial ischemia. 2.  Normal left ventricular systolic function.         Prashant Romo DO    D: 03/01/2022 #18:21:07       T: 03/01/2022 19:00:26     [email or web address]  Job#: [id number]     Doc#: [id number]    CC:

## 2022-03-03 ENCOUNTER — OFFICE VISIT (OUTPATIENT)
Dept: CARDIOLOGY CLINIC | Age: 52
End: 2022-03-03
Payer: COMMERCIAL

## 2022-03-03 VITALS
BODY MASS INDEX: 33.57 KG/M2 | OXYGEN SATURATION: 100 % | DIASTOLIC BLOOD PRESSURE: 80 MMHG | TEMPERATURE: 97 F | SYSTOLIC BLOOD PRESSURE: 118 MMHG | HEART RATE: 67 BPM | WEIGHT: 208 LBS | RESPIRATION RATE: 16 BRPM

## 2022-03-03 DIAGNOSIS — R06.00 DYSPNEA, UNSPECIFIED TYPE: ICD-10-CM

## 2022-03-03 DIAGNOSIS — I25.10 CORONARY ARTERY DISEASE INVOLVING NATIVE CORONARY ARTERY OF NATIVE HEART WITHOUT ANGINA PECTORIS: Primary | ICD-10-CM

## 2022-03-03 PROCEDURE — 99213 OFFICE O/P EST LOW 20 MIN: CPT | Performed by: NURSE PRACTITIONER

## 2022-03-03 RX ORDER — CHLORAL HYDRATE 500 MG
1000 CAPSULE ORAL 3 TIMES DAILY
COMMUNITY

## 2022-03-03 RX ORDER — ASPIRIN 81 MG/1
81 TABLET ORAL DAILY
COMMUNITY

## 2022-03-03 RX ORDER — ASCORBIC ACID 500 MG
500 TABLET ORAL DAILY
COMMUNITY

## 2022-03-03 ASSESSMENT — ENCOUNTER SYMPTOMS
NAUSEA: 0
GASTROINTESTINAL NEGATIVE: 1
VOMITING: 0
WHEEZING: 0
SHORTNESS OF BREATH: 0
EYES NEGATIVE: 1
ABDOMINAL PAIN: 0

## 2022-03-03 NOTE — PROGRESS NOTES
3/3/2022  CC: CAD     HPI:    Sheron Cheema (:  1970) has requested an audio/video evaluation for the following concern(s): PFO    S/p PCI of mid LAD. S/p PFO closure with Tad Cardioform. Pt denies chest pain, dyspnea, dyspnea on exertion, change in exercise capacity, fatigue,  nausea, vomiting, diarrhea, constipation, motor weakness, insomnia, weight loss, syncope, dizziness, lightheadedness, palpitations, PND, orthopnea, or claudication. No nitro use. BP and hr are good. CAD is stable. No LE discoloration or ulcers. No LE edema. No CHF type symptoms. Lipid profile is normal. No recent hospitalization. No change in meds. On DAPT. No bleeding issues. Has had some chest fluttering at times 1-2xday. No neuro type symptoms. 10/22/2020: Patient has been experiencing increased fatigue tiredness for the past couple weeks. He has been stressed out and working more. With history of coronary disease status post mid LAD PCI and PFO closure in 2020. Patient also complains of lightheadedness/dizziness. Blood pressure is 112/84 heart rate of 58 bpm.  Patient is on aspirin and Plavix. He is also on a statin. Status post limited echo in 2020 with no evidence of PFO. That is post 48-hour Holter monitor with which was essentially negative and normal.  EKG with sinus bradycardia today at heart rate of 50 bpm, no ischemia. States he is having some memory issues. 21:  Developed LH/dizz at times. With history of coronary disease status post mid LAD PCI and PFO closure in 2020. Pt denies chest pain, dyspnea, dyspnea on exertion, change in exercise capacity, fatigue,  nausea, vomiting, diarrhea, constipation, motor weakness, insomnia, weight loss, syncope, dizziness, lightheadedness, palpitations, PND, orthopnea, or claudication. No nitro use. BP and hr are good. CAD is stable. No LE discoloration or ulcers. No LE edema. No CHF type symptoms.  Lipid profile is normal. No recent hospitalization. No change in meds. 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 Pt denies chest pain,,  nausea, vomiting, diarrhea, constipation, motor weakness, insomnia, weight loss, syncope, dizziness, lightheadedness, palpitations, PND, orthopnea, or claudication. Hx of CAD s/p PCI of LAD, hx of PFO closure. Has lost weight overall. 3/3/2022: Patient seen for follow-up visit to discuss test results. Patient had stress test on 2/28/2022 which showed:  LVEF:  63%  TID ratio:  1.09     IMPRESSION:  1. No evidence of stress-induced myocardial ischemia. 2.  Normal left ventricular systolic function. Patient reports continued, intermittent episodes of shortness of breath with exertion. Patient states his symptoms are worse when the weather is cold. Patient states he feels like he cannot get a deep breath in. Denies chest pain. Patient does admit to smoking cigars. Patient denies history of asthma or COPD. States he he had a sleep study years ago and was told he had borderline WOOD and was given a CPAP but it did not help him so he stopped using it. Hx of CAD s/p PCI of LAD, hx of PFO closure. Review of Systems   Constitutional: Negative. Negative for chills and fever. Eyes: Negative. Respiratory: Negative for shortness of breath and wheezing. Cardiovascular: Negative for chest pain, palpitations and leg swelling. Gastrointestinal: Negative. Negative for abdominal pain, nausea and vomiting. Skin: Negative. Negative for rash. Neurological: Negative for dizziness, weakness and headaches. Prior to Visit Medications    Medication Sig Taking?  Authorizing Provider   aspirin 81 MG EC tablet Take 81 mg by mouth daily Yes Historical Provider, MD   Omega-3 Fatty Acids (FISH OIL) 1000 MG CAPS Take 1,000 mg by mouth 3 times daily Yes Historical Provider, MD   VITAMIN D, CHOLECALCIFEROL, PO Take 1,000 Units by mouth Yes Historical Provider, MD   vitamin C (ASCORBIC ACID) 500 MG tablet Take 500 mg by mouth daily Yes Historical Provider, MD   atorvastatin (LIPITOR) 40 MG tablet TAKE ONE TABLET BY MOUTH EVERY DAY Yes Kirt Canseco,    amphetamine-dextroamphetamine (ADDERALL XR) 10 MG extended release capsule Take 1 capsule by mouth every morning for 30 days. Yes Jason Holman MD   albuterol sulfate  (90 Base) MCG/ACT inhaler Inhale into the lungs Yes Historical Provider, MD   ALPRAZolam Michael Estrella) 0.5 MG tablet Take 0.5 mg by mouth every 4 hours as needed for Anxiety. Yes Historical Provider, MD       Social History     Tobacco Use    Smoking status: Current Every Day Smoker     Years: 4.00     Types: Cigars    Smokeless tobacco: Never Used   Substance Use Topics    Alcohol use: Not Currently    Drug use: Not Currently        No Known Allergies,   Past Medical History:   Diagnosis Date    Anxiety     Coronary artery disease involving native coronary artery of native heart without angina pectoris 10/22/2020    CVA [facility]cerebral vascular accident) ([facility] [address]     Hyperlipidemia     WOOD (obstructive sleep apnea)     PFO (patent foramen ovale)    ,   Past Surgical History:   Procedure Laterality Date    DIAGNOSTIC CARDIAC CATH LAB PROCEDURE  02/27/2020    PERIPHERAL PERCUTANEOUS ARTERIAL INTERVENTION  02/27/2020   , History reviewed. No pertinent family history. Physical Examination:  General appearance - alert, well appearing, and in no distress and overweight  Mental status - alert, oriented to person, place, and time  Neck - Neck is supple, no JVD or carotid bruits. No thyromegaly or adenopathy.    Chest - clear to auscultation, no wheezes, rales or rhonchi, symmetric air entry  Heart - normal rate, regular rhythm, normal S1, S2, no murmurs, rubs, clicks or gallops  Abdomen - soft, nontender, nondistended, no masses or organomegaly  Neurological - alert, oriented, normal speech, no focal findings or movement disorder noted  Extremities - peripheral pulses normal, no pedal edema, no clubbing or cyanosis  Skin - normal coloration and turgor, no rashes, no suspicious skin lesions noted    ASSESSMENT:        Diagnosis Orders   1. Coronary artery disease involving native coronary artery of native heart without angina pectoris  Natasha Damian MD, Pulmonology, [place]   2. Dyspnea, unspecified type  Natasha Damian MD, Pulmonology, Manjinder       PLAN:     cont with ASA. Follow-up with neurology    Follow-up with pulmonology    Patient was advised and encouraged to check blood pressure at home or at a pharmacy, maintain a logbook, and also call us back if blood pressure are above the target ranges or if it is low. Patient clearly understands and agrees to the instructions. We will need to continue to monitor muscle and liver enzymes, BUN, CR, and electrolytes. The nature of cardiac risk has been fully discussed with this patient. I have made him aware of his LDL target goal given his cardiovascular risk analysis. I have discussed the appropriate diet. The need for lifelong compliance in order to reduce risk is stressed. A regular exercise program is recommended to help achieve and maintain normal body weight, fitness and improve lipid balance.

## 2022-03-14 ENCOUNTER — OFFICE VISIT (OUTPATIENT)
Dept: PULMONOLOGY | Age: 52
End: 2022-03-14
Payer: COMMERCIAL

## 2022-03-14 VITALS
OXYGEN SATURATION: 98 % | HEART RATE: 72 BPM | HEIGHT: 66 IN | BODY MASS INDEX: 32.78 KG/M2 | SYSTOLIC BLOOD PRESSURE: 124 MMHG | DIASTOLIC BLOOD PRESSURE: 70 MMHG | WEIGHT: 204 LBS | TEMPERATURE: 98.2 F

## 2022-03-14 DIAGNOSIS — J45.909 UNCOMPLICATED ASTHMA, UNSPECIFIED ASTHMA SEVERITY, UNSPECIFIED WHETHER PERSISTENT: ICD-10-CM

## 2022-03-14 DIAGNOSIS — R06.2 WHEEZING: ICD-10-CM

## 2022-03-14 DIAGNOSIS — R05.9 COUGH: ICD-10-CM

## 2022-03-14 DIAGNOSIS — R06.02 SHORTNESS OF BREATH: Primary | ICD-10-CM

## 2022-03-14 DIAGNOSIS — R06.02 SHORTNESS OF BREATH: ICD-10-CM

## 2022-03-14 PROCEDURE — 99203 OFFICE O/P NEW LOW 30 MIN: CPT | Performed by: INTERNAL MEDICINE

## 2022-03-14 NOTE — PROGRESS NOTES
NEW PATIENT VISIT-PULMONARY/SLEEP    3/14/2022     REFERRING PHYSICIAN:  Lenora Purvis MD     REASON FOR REFERRAL:  Shortness of breath    HPI:     Myles Lynch is a 49 y.o. male  who was referred to pulmonary clinic for evaluation. He was referred due to shortness of breath for 6-12 months. Slowly progressive over last year. Has been associated with some cough. he also has noticed some wheezing. He has been on Albuterol for sometime as well but never had a PFT. His symptoms are better when he is using albuterol. He has not been on maintenance inhalers. Had cardiac work up including stress test which was unreliable. He had a chest x-ray completed several which was unremarkable as well. 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             Past Medical History   Past Medical History:   Diagnosis Date    Anxiety     Coronary artery disease involving native coronary artery of native heart without angina pectoris 10/22/2020    CVA (cerebral vascular accident) ([address]     Hyperlipidemia     WOOD (obstructive sleep apnea)     PFO (patent foramen ovale)        Past Surgical History  Past Surgical History:   Procedure Laterality Date    DIAGNOSTIC CARDIAC CATH LAB PROCEDURE  02/27/2020    PERIPHERAL PERCUTANEOUS ARTERIAL INTERVENTION  02/27/2020       Allergies  No Known Allergies    Medications  Current Outpatient Medications   Medication Sig Dispense Refill    aspirin 81 MG EC tablet Take 81 mg by mouth daily      Omega-3 Fatty Acids (FISH OIL) 1000 MG CAPS Take 1,000 mg by mouth 3 times daily      VITAMIN D, CHOLECALCIFEROL, PO Take 1,000 Units by mouth      vitamin C (ASCORBIC ACID) 500 MG tablet Take 500 mg by mouth daily      atorvastatin (LIPITOR) 40 MG tablet TAKE ONE TABLET BY MOUTH EVERY DAY 90 tablet 3    albuterol sulfate  (90 Base) MCG/ACT inhaler Inhale into the lungs      ALPRAZolam (XANAX) 0.5 MG tablet Take 0.5 mg by mouth every 4 hours as needed for Anxiety.  amphetamine-dextroamphetamine (ADDERALL XR) 10 MG extended release capsule Take 1 capsule by mouth every morning for 30 days. 30 capsule 0     No current facility-administered medications for this visit. Social History  Social History     Tobacco Use    Smoking status: Current Every Day Smoker     Years: 4.00     Types: Cigars    Smokeless tobacco: Never Used   Substance Use Topics    Alcohol use: Not Currently       Family History  Non pertinent. Review of Systems  All review of systems has been obtained and negative other than what was mentionedin HPI. Physical Exam          Vitals:    03/14/22 1459   BP: 124/70   Pulse: 72   Temp: 98.2 °F (36.8 °C)   SpO2: 98%   Weight: 204 lb (92.5 kg)   Height: 5' 6\" (1.676 m)          General appearance: Well appearing. No acute distress. AAOX3  Head: Normocephalic, without obvious abnormality, atraumatic   Eyes:Pupils bilateral equal and reactive, EOM intact. Normal sclera and conjunctiva   Nose: Mucosa pink  Throat: Clear,  [place] [address]  Neck: Supple, No JVD. Nothyromegaly. Neck is thick  Lungs: Clear bilaterally. No wheezing. No crackles. No use of accessory muscles. Heart: RRR, S1, S2 normal, no murmur, click, rub or gallop   Abdomen: soft, non-tender, nondistended. Bowel sounds normal. No hernia. No organomegaly. Extremities: extremities normal, atraumatic, no cyanosis, no edema  Skin: Skin color, texture, turgor normal. No rashes or lesions   Neurological: No focal deficits,cranial nerves grossly intact. No weakness. Sensation normal   Psych: Normal Mood  Musculoskeletal: No joint abnormalities. Impression:   Diagnosis Orders   1. Shortness of breath  Full PFT Study With Bronchodilator    Allergen, Region 5 Respiratory Panel    Hypersensitivity Pneumonitis Extended Panel   2.  Cough  Full PFT Study With Bronchodilator    Allergen, Region 5 Respiratory Panel    Hypersensitivity Pneumonitis Extended Panel   3. Wheezing     4. Uncomplicated asthma, unspecified asthma severity, unspecified whether persistent           Orders Placed This Encounter   Procedures    Allergen, Region 5 Respiratory Panel     Standing Status:   Future     Number of Occurrences:   1     Standing Expiration Date:   3/14/2023    Hypersensitivity Pneumonitis Extended Panel     Standing Status:   Future     Number of Occurrences:   1     Standing Expiration Date:   3/14/2023    Full PFT Study With Bronchodilator     If an ABG is needed along with this PFT procedure, please place the appropriate lab order     Standing Status:   Future     Standing Expiration Date:   6/14/2022           Recommendations:     -He could have hypersensitivity pneumonitis, allergic asthma or others.    -We will start with pulmonary function test.  Consider CT chest if evidence of restrictive lung disease.  -Check comprehensive allergy panel.  -Check hypersensitivity panel.    -Continue with albuterol for now. Will likely need maintenance inhaler but will hold. Return in about 4 weeks (around 4/11/2022).        Electronically signed by Mandy Brown MD on 3/14/2022 at 3:46 PM

## 2022-03-16 ENCOUNTER — OFFICE VISIT (OUTPATIENT)
Dept: NEUROLOGY | Age: 52
End: 2022-03-16
Payer: COMMERCIAL

## 2022-03-16 VITALS
SYSTOLIC BLOOD PRESSURE: 132 MMHG | BODY MASS INDEX: 32.93 KG/M2 | DIASTOLIC BLOOD PRESSURE: 76 MMHG | WEIGHT: 204 LBS | HEART RATE: 72 BPM

## 2022-03-16 DIAGNOSIS — F90.0 ATTENTION DEFICIT HYPERACTIVITY DISORDER (ADHD), PREDOMINANTLY INATTENTIVE TYPE: ICD-10-CM

## 2022-03-16 DIAGNOSIS — R41.3 MEMORY IMPAIRMENT: ICD-10-CM

## 2022-03-16 DIAGNOSIS — I63.522 CEREBROVASCULAR ACCIDENT (CVA) DUE TO STENOSIS OF LEFT ANTERIOR CEREBRAL ARTERY (HCC): Primary | ICD-10-CM

## 2022-03-16 DIAGNOSIS — Q21.12 PFO (PATENT FORAMEN OVALE): ICD-10-CM

## 2022-03-16 DIAGNOSIS — I69.351 HEMIPLEGIA OF RIGHT DOMINANT SIDE AS LATE EFFECT OF CEREBRAL INFARCTION, UNSPECIFIED HEMIPLEGIA TYPE (HCC): ICD-10-CM

## 2022-03-16 LAB
2000687N OAK TREE IGE: 1.61 KU/L (ref 0–0.34)
ALLERGEN BERMUDA GRASS IGE: 1.73 KU/L (ref 0–0.34)
ALLERGEN BIRCH IGE: 1.46 KU/L (ref 0–0.34)
ALLERGEN DOG DANDER IGE: 0.17 KU/L (ref 0–0.34)
ALLERGEN GERMAN COCKROACH IGE: 1.08 KU/L (ref 0–0.34)
ALLERGEN HORMODENDRUM IGE: <0.1 KUL/L (ref 0–0.34)
ALLERGEN MOUSE EPITHELIA IGE: <0.1 KU/L (ref 0–0.34)
ALLERGEN PECAN TREE IGE: 1.56 KU/L (ref 0–0.34)
ALLERGEN PIGWEED ROUGH IGE: 1.47 KU/L (ref 0–0.34)
ALLERGEN SHEEP SORREL (W18) IGE: 1.55 KU/L (ref 0–0.34)
ALLERGEN TREE SYCAMORE: 1.7 KU/L (ref 0–0.34)
ALLERGEN WALNUT TREE IGE: 1.81 KU/L (ref 0–0.34)
ALLERGEN WHITE MULBERRY TREE, IGE: 1.38 KU/L (ref 0–0.34)
ALLERGEN, TREE, WHITE ASH IGE: 2 KU/L (ref 0–0.34)
ALTERNARIA ALTERNATA: 4.68 KU/L (ref 0–0.34)
ASPERGILLUS FUMIGATUS: 0.27 KU/L (ref 0–0.34)
CAT DANDER ANTIBODY: <0.1 KU/L (ref 0–0.34)
COTTONWOOD TREE: 1.38 KU/L (ref 0–0.34)
D. FARINAE: 2.53 KU/L (ref 0–0.34)
D. PTERONYSSINUS: 3.71 KU/L (ref 0–0.34)
ELM TREE: 1.73 KU/L (ref 0–0.34)
IGE: 180 IU/ML
MAPLE/BOXELDER TREE: 1.85 KU/L (ref 0–0.34)
MOUNTAIN CEDAR TREE: 1.35 KU/L (ref 0–0.34)
MUCOR RACEMOSUS: 1.19 KU/L (ref 0–0.34)
P. NOTATUM: <0.1 KU/L (ref 0–0.34)
RUSSIAN THISTLE: 1.73 KU/L (ref 0–0.34)
SHORT RAGWD(A ARTEMIS.) IGE: 2.2 KU/L (ref 0–0.34)
TIMOTHY GRASS: 7.49 KU/L (ref 0–0.34)

## 2022-03-16 PROCEDURE — 99214 OFFICE O/P EST MOD 30 MIN: CPT | Performed by: PSYCHIATRY & NEUROLOGY

## 2022-03-16 ASSESSMENT — ENCOUNTER SYMPTOMS
COLOR CHANGE: 0
TROUBLE SWALLOWING: 0
BACK PAIN: 0
CHOKING: 0
SHORTNESS OF BREATH: 0
VOMITING: 0
NAUSEA: 0
PHOTOPHOBIA: 0

## 2022-03-16 NOTE — PROGRESS NOTES
Subjective:      Patient ID: Romina Scott is a 49 y.o. male who presents today for:  Chief Complaint   Patient presents with    Follow-up     Pt states that things have been okay. He says that his short term memory has been getting worse, but he says he knows it will just keep getting worse. 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 This is left the patient with apathy and some degree of attention deficit. Patient had a PFO closure in February 2020. After the stroke patient has started the loss of focusing issues and concentration issues and is on Adderall which is helping. Patient's risk factors for vascular's were reviewed and appeared to be much better controlled has had good follow-up with cardiology as well. Is not any bleeding or bruising. Still reports that his short-term memory appears to be worsening still appears to be quite functional.  Patient reports that on most occasion is good though and once in a while he forgets something and which is very strange like forgetting the name of his dog who has been with him for some time.   This does fluctuate and this is consistent with an underlying vasculopathy    Past Medical History:   Diagnosis Date    Anxiety     Coronary artery disease involving native coronary artery of native heart without angina pectoris 10/22/2020    CVA (cerebral vascular accident) ([address]     Hyperlipidemia     WOOD (obstructive sleep apnea)     PFO (patent foramen ovale)      Past Surgical History:   Procedure Laterality Date    DIAGNOSTIC CARDIAC CATH LAB PROCEDURE  02/27/2020    PERIPHERAL PERCUTANEOUS ARTERIAL INTERVENTION  02/27/2020     Social History     Socioeconomic History    Marital status:      Spouse name: Not on file    Number of children: Not on file    Years of education: Not on file    Highest education level: Not on file   Occupational History    Not on file   Tobacco Use    Smoking status: Current Every Day Smoker     Years: 4.00     Types: Cigars    Smokeless tobacco: Never Used   Substance and Sexual Activity    Alcohol use: Not Currently    Drug use: Not Currently    Sexual activity: Yes   Other Topics Concern    Not on file   Social History Narrative    Not on file     Social Determinants of Health     Financial Resource Strain:     Difficulty of Paying Living Expenses: Not on file   Food Insecurity:     Worried About Running Out of Food in the Last Year: Not on file    Samantha of Food in the Last Year: Not on file   Transportation Needs:     Lack of Transportation (Medical): Not on file    Lack of Transportation (Non-Medical): Not on file   Physical Activity:     Days of Exercise per Week: Not on file    Minutes of Exercise per Session: Not on file   Stress:     Feeling of Stress : Not on file   Social Connections:     Frequency of Communication with Friends and Family: Not on file    Frequency of Social Gatherings with Friends and Family: Not on file    Attends Jew Services: Not on file    Active Member of [address] [place] [address] or Organizations: Not on file    Attends Club or Organization Meetings: Not on file    Marital Status: Not on file   Intimate Partner Violence:     Fear of Current or Ex-Partner: Not on file    Emotionally Abused: Not on file    Physically Abused: Not on file    Sexually Abused: Not on file   Housing Stability:     Unable to Pay for Housing in the Last Year: Not on file    Number of [place] in the Last Year: Not on file    Unstable Housing in the Last Year: Not on file     No family history on file.   No Known Allergies    Current Outpatient Medications   Medication Sig Dispense Refill    aspirin 81 MG EC tablet Take 81 mg by mouth daily      Omega-3 Fatty Acids (FISH OIL) 1000 MG CAPS Take 1,000 mg by mouth 3 times daily      VITAMIN D, CHOLECALCIFEROL, PO Take 1,000 Units by mouth      vitamin C (ASCORBIC ACID) 500 MG tablet Take 500 mg by mouth daily      atorvastatin (LIPITOR) 40 MG tablet TAKE ONE TABLET BY MOUTH EVERY DAY 90 tablet 3    amphetamine-dextroamphetamine (ADDERALL XR) 10 MG extended release capsule Take 1 capsule by mouth every morning for 30 days. 30 capsule 0    albuterol sulfate  (90 Base) MCG/ACT inhaler Inhale into the lungs      ALPRAZolam (XANAX) 0.5 MG tablet Take 0.5 mg by mouth every 4 hours as needed for Anxiety. No current facility-administered medications for this visit. Review of Systems   Constitutional: Negative for fever. HENT: Negative for ear pain, tinnitus and trouble swallowing. Eyes: Negative for photophobia and visual disturbance. Respiratory: Negative for choking and shortness of breath. Cardiovascular: Negative for chest pain and palpitations. Gastrointestinal: Negative for nausea and vomiting. Musculoskeletal: Negative for back pain, gait problem, joint swelling, myalgias, neck pain and neck stiffness. Skin: Negative for color change. Allergic/Immunologic: Negative for food allergies. Neurological: Negative for dizziness, tremors, seizures, syncope, facial asymmetry, speech difficulty, weakness, light-headedness, numbness and headaches. Psychiatric/Behavioral: Negative for behavioral problems, confusion, hallucinations and sleep disturbance. Objective:   /76 (Site: Left Upper Arm, Position: Sitting, Cuff Size: Medium Adult)   Pulse 72   Wt 204 lb (92.5 kg)   BMI 32.93 kg/m²     Physical Exam  Vitals reviewed. Eyes:      Pupils: Pupils are equal, round, and reactive to light. Cardiovascular:      Rate and Rhythm: Normal rate and regular rhythm. Heart sounds: No murmur heard. Pulmonary:      Effort: Pulmonary effort is normal.      Breath sounds: Normal breath sounds. Abdominal:      General: Bowel sounds are normal.   Musculoskeletal:         General: Normal range of motion. Cervical back: Normal range of motion. Skin:     General: Skin is warm. Neurological:      Mental Status: He is alert and oriented to person, place, and time. Cranial Nerves: No cranial nerve deficit. Sensory: No sensory deficit. Motor: No abnormal muscle tone. Coordination: Coordination normal.      Deep Tendon Reflexes: Reflexes are normal and symmetric. Babinski sign absent on the right side. Babinski sign absent on the left side. Psychiatric:         Mood and Affect: Mood normal.         ECHO Complete 2D W Doppler W Color    Result Date: 2/28/2022  Transthoracic Echocardiography Report (TTE)  Demographics   Patient Name   Irena Vitale Gender              Male                 G   Patient Number [id number]         Race                                                   Ethnicity   Visit Number   [id number]        Room Number   Corporate ID                    Date of Study       02/28/2022   Accession      [id number]       Referring Physician Ange Mishra.,   Number   Date of Birth  1970       Sonographer         Mac Johnston [facility]   Age            49 year(s)       [place] [address]                                  Physician           Cardiology                                                      Ange Mishra., DO  Procedure Type of Study   TTE procedure:ECHO COMPLETE 2D W/DOP W/COLOR. Procedure Date Date: 02/28/2022 Start: 08:41 AM Study Location: Echo Lab Technical Quality: Adequate visualization Indications:Shortness of breath. Patient Status: Routine Height: 66 inches Weight: 201 pounds BSA: 2 m^2 BMI: 32.44 kg/m^2 BP: 120/82 mmHg  Conclusions   Summary  Normal left ventricular systolic function, no regional wall motion  abnormalities, estimated ejection fraction of 65%. Normal left ventricular wall thickness. Normal diastolic filling pattern for age. No evidence of mitral regurgitation. No evidence of aortic valve regurgitation .   No evidence of aortic valve stenosis. There is Trace tricuspid regurgitation with estimated RVSP of 30 mm Hg. NO PFO with color flow   Signature   ----------------------------------------------------------------  Electronically signed by Moises Francis DO(Interpreting  physician) on 02/28/2022 04:57 PM  ----------------------------------------------------------------   Findings  Left Ventricle Normal left ventricle structure and function. Normal left ventricular systolic function, no regional wall motion abnormalities, estimated ejection fraction of 65%. Normal left ventricular wall thickness. Normal diastolic filling pattern for age. Right Ventricle Normal right ventricle structure and function. Normal right ventricle systolic pressure. Left Atrium Normal left atrium. Right Atrium Normal right atrium. Mitral Valve Normal mitral valve structure and function. No evidence of mitral regurgitation. No evidence of mitral valve stenosis. Tricuspid Valve Normal tricuspid valve structure and function. There is Trace tricuspid regurgitation with estimated RVSP of 30 mm Hg. Aortic Valve Normal aortic valve structure and function. No evidence of aortic valve regurgitation . No evidence of aortic valve stenosis. Pulmonic Valve Normal pulmonic valve structure and function. Pericardial Effusion No evidence of pericardial effusion. Pleural Effusion No evidence of pleural effusion. Aorta \ Miscellaneous Miscellaneous normal findings were found. M-Mode Measurements (cm)   LVIDd: 4.81 cm                         LVIDs: 2.95 cm  IVSd: 1.13 cm                          IVSs: 1.3 cm  LVPWd: 1.01 cm                         LVPWs: 1.16 cm  Rt. Vent.  Dimension: 3.24 cm           AO Root Dimension: 3.32 cm                                         ACS: 2.18 cm                                         LA: 4.03 cm                                         LVOT: 2.06 cm  Doppler Measurements:   AV Velocity:0.03 m/s                    MV Peak E-Wave: 0.91 m/s  AV RV Systolic Pressure: 07.34 mmHg  Aorta/ Miscellaneous Aorta   Aortic Root: 3.32 cm  LVOT Diameter: 2.06 cm      NM MYOCARDIAL SPECT REST EXERCISE OR RX    Result Date: 2/28/2022  Report can be found in Epic under Chart Review -Cardiology- Stress Test Report for the date performed. Lab Results   Component Value Date    WBC 10.6 03/06/2020    WBC 7.5 02/27/2020    RBC 4.97 03/06/2020    RBC 4.62 07/18/2018    HGB 14.3 03/06/2020    HCT 43.0 03/06/2020    MCV 87 03/06/2020    MCH 29.3 02/27/2020    MCHC 33.3 03/06/2020    RDW 13.3 02/27/2020     03/06/2020    MPV 11.1 07/18/2018     Lab Results   Component Value Date     03/06/2020    K 4.7 03/06/2020     03/06/2020    CO2 23 02/27/2020    BUN 16 02/27/2020    CREATININE 1.06 03/06/2020    GFRAA >60 03/06/2020    LABGLOM >60 03/06/2020    GLUCOSE 103 03/06/2020    PROT 7.0 02/27/2020    LABALBU 4.1 02/27/2020    CALCIUM 9.1 03/06/2020    BILITOT 0.3 02/27/2020    ALKPHOS 66 02/27/2020    AST 21 02/27/2020    ALT 22 02/27/2020     Lab Results   Component Value Date    PROTIME 11.4 03/05/2020    INR 1.0 03/05/2020     No results found for: TSH, [id number], FOLATE, FERRITIN, IRON, TIBC, PTRFSAT, RETICCOUNT, TSH, FREET4  Lab Results   Component Value Date    TRIG 82 07/18/2018    HDL 58 07/18/2018    LDLCALC 145 07/18/2018    LABVLDL 16 07/18/2018     No results found for: LABAMPH, BARBSCNU, LABBENZ, CANNAB, COCAINESCRN, LABMETH, OPIATESCREENURINE, PHENCYCLIDINESCREENURINE, PPXUR, ETOH  No results found for: LITHIUM, DILFRTOT, VALPROATE    Assessment:       Diagnosis Orders   1. Cerebrovascular accident (CVA) due to stenosis of left anterior cerebral artery ([facility]     2. Attention deficit hyperactivity disorder (ADHD), predominantly inattentive type     3. Hemiplegia of right dominant side as late effect of cerebral infarction, unspecified hemiplegia type ([facility]     4. PFO (patent foramen ovale)     5.  Memory impairment       Left anterior cerebral artery stroke secondary to a PFO. Patient actually doing well with the PFO closure and has very minimal risk factors for some vascular disease. His main issues have been difficulty focusing and concentrating and has developed an acute concentration issues which have responded very well to very low-dose of Adderall 10 mg. He still has memory issues which we expect will stay though this would be very fluctuant given the area of involvement. 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 Is very functional otherwise and therefore this is not of concern. There is no one treatment for the same. We reassured him that this is part of the stroke and may fluctuate with good days and bad days and will continue the Adderall for now at the dose he is on. Plan:      No orders of the defined types were placed in this encounter. No orders of the defined types were placed in this encounter. No follow-ups on file.       Stephen Moncada MD

## 2022-03-20 LAB
ALLERGEN BEEF: <0.1 KU/L
ALLERGEN PHOMA BETAE: 1.16 KU/L
ALLERGEN PORK: <0.1 KU/L
ALLERGEN SEE NOTE: ABNORMAL
ALLERGEN, FEATHER MIX: NEGATIVE KU/L
ASPERGILLUS FLAVUS ANTIBODIES: ABNORMAL
ASPERGILLUS FUMIGATUS #1: ABNORMAL
ASPERGILLUS FUMIGATUS #2: ABNORMAL
ASPERGILLUS FUMIGATUS #3: ABNORMAL
ASPERGILLUS FUMIGATUS #6: ABNORMAL
AUREOBASIDIUM PULLULANS: ABNORMAL
MICROPOLYSPORA FAENI: ABNORMAL
PIGEON SERUM ABS: ABNORMAL
SACCHAROMONOSPORA VIRIDIS: ABNORMAL
THERMOACTINOMYCES CANDIDUS AB: ABNORMAL
THERMOACTINOMYCES VULGARIS #1: ABNORMAL

## 2022-03-20 RX ORDER — DEXTROAMPHETAMINE SACCHARATE, AMPHETAMINE ASPARTATE MONOHYDRATE, DEXTROAMPHETAMINE SULFATE AND AMPHETAMINE SULFATE 2.5; 2.5; 2.5; 2.5 MG/1; MG/1; MG/1; MG/1
10 CAPSULE, EXTENDED RELEASE ORAL EVERY MORNING
Qty: 30 CAPSULE | Refills: 0 | Status: SHIPPED | OUTPATIENT
Start: 2022-03-20 | End: 2022-03-23 | Stop reason: SDUPTHER

## 2022-03-23 DIAGNOSIS — F90.0 ATTENTION DEFICIT HYPERACTIVITY DISORDER (ADHD), PREDOMINANTLY INATTENTIVE TYPE: ICD-10-CM

## 2022-03-23 RX ORDER — DEXTROAMPHETAMINE SACCHARATE, AMPHETAMINE ASPARTATE MONOHYDRATE, DEXTROAMPHETAMINE SULFATE AND AMPHETAMINE SULFATE 2.5; 2.5; 2.5; 2.5 MG/1; MG/1; MG/1; MG/1
10 CAPSULE, EXTENDED RELEASE ORAL EVERY MORNING
Qty: 30 CAPSULE | Refills: 0 | Status: SHIPPED | OUTPATIENT
Start: 2022-03-23 | End: 2022-04-26 | Stop reason: SDUPTHER

## 2022-04-20 ENCOUNTER — HOSPITAL ENCOUNTER (OUTPATIENT)
Dept: PULMONOLOGY | Age: 52
Discharge: HOME | End: 2022-04-20
Payer: COMMERCIAL

## 2022-04-20 DIAGNOSIS — R06.02 SHORTNESS OF BREATH: ICD-10-CM

## 2022-04-20 DIAGNOSIS — R05.9 COUGH: ICD-10-CM

## 2022-04-20 PROCEDURE — 94010 BREATHING CAPACITY TEST: CPT

## 2022-04-20 PROCEDURE — 94729 DIFFUSING CAPACITY: CPT

## 2022-04-20 PROCEDURE — 94726 PLETHYSMOGRAPHY LUNG VOLUMES: CPT

## 2022-04-25 PROCEDURE — 94010 BREATHING CAPACITY TEST: CPT | Performed by: INTERNAL MEDICINE

## 2022-04-25 PROCEDURE — 94729 DIFFUSING CAPACITY: CPT | Performed by: INTERNAL MEDICINE

## 2022-04-25 PROCEDURE — 94726 PLETHYSMOGRAPHY LUNG VOLUMES: CPT | Performed by: INTERNAL MEDICINE

## 2022-04-25 NOTE — PROCEDURES
[address]                      [facility], [address] [facility]                    PULMONARY FUNCTION  David Lee   49 y.o.   male  Height 71 in  Weight 204 lb      Referring provider   Didier Del Rosario MD    Reading provider   Jairo Cuevas MD    Test meets ATS criteria for acceptability and reproducibility Yes        Test interpretation     Spirometry, lung volumes and DLCO are normal       Clinical correlation is recommended     Jairo Cuevas MD [facility], 4/25/2022 11:30 AM

## 2022-04-26 DIAGNOSIS — F90.0 ATTENTION DEFICIT HYPERACTIVITY DISORDER (ADHD), PREDOMINANTLY INATTENTIVE TYPE: ICD-10-CM

## 2022-04-26 RX ORDER — DEXTROAMPHETAMINE SACCHARATE, AMPHETAMINE ASPARTATE MONOHYDRATE, DEXTROAMPHETAMINE SULFATE AND AMPHETAMINE SULFATE 2.5; 2.5; 2.5; 2.5 MG/1; MG/1; MG/1; MG/1
10 CAPSULE, EXTENDED RELEASE ORAL EVERY MORNING
Qty: 30 CAPSULE | Refills: 0 | Status: SHIPPED | OUTPATIENT
Start: 2022-04-26 | End: 2022-05-25 | Stop reason: SDUPTHER

## 2022-05-24 DIAGNOSIS — F90.0 ATTENTION DEFICIT HYPERACTIVITY DISORDER (ADHD), PREDOMINANTLY INATTENTIVE TYPE: ICD-10-CM

## 2022-05-25 RX ORDER — DEXTROAMPHETAMINE SACCHARATE, AMPHETAMINE ASPARTATE MONOHYDRATE, DEXTROAMPHETAMINE SULFATE AND AMPHETAMINE SULFATE 2.5; 2.5; 2.5; 2.5 MG/1; MG/1; MG/1; MG/1
10 CAPSULE, EXTENDED RELEASE ORAL EVERY MORNING
Qty: 30 CAPSULE | Refills: 0 | Status: SHIPPED | OUTPATIENT
Start: 2022-05-25 | End: 2022-06-27 | Stop reason: SDUPTHER

## 2022-06-27 DIAGNOSIS — F90.0 ATTENTION DEFICIT HYPERACTIVITY DISORDER (ADHD), PREDOMINANTLY INATTENTIVE TYPE: ICD-10-CM

## 2022-06-27 RX ORDER — DEXTROAMPHETAMINE SACCHARATE, AMPHETAMINE ASPARTATE MONOHYDRATE, DEXTROAMPHETAMINE SULFATE AND AMPHETAMINE SULFATE 2.5; 2.5; 2.5; 2.5 MG/1; MG/1; MG/1; MG/1
10 CAPSULE, EXTENDED RELEASE ORAL EVERY MORNING
Qty: 30 CAPSULE | Refills: 0 | Status: SHIPPED | OUTPATIENT
Start: 2022-06-27 | End: 2022-07-27 | Stop reason: SDUPTHER

## 2022-06-27 NOTE — TELEPHONE ENCOUNTER
Patient is requesting medication refill. Please approve or deny this request.    Rx requested:  Requested Prescriptions     Pending Prescriptions Disp Refills    amphetamine-dextroamphetamine (ADDERALL XR) 10 MG extended release capsule 30 capsule 0     Sig: Take 1 capsule by mouth every morning for 30 days.          Last Office Visit:   3/16/2022      Next Visit Date:  Future Appointments   Date Time Provider Faustino Potter   8/19/2022 11:00 AM Kirt Watt DO [address] [place] [address]   9/21/2022  1:45 PM Rosalba Crump MD [facility]

## 2022-07-27 DIAGNOSIS — F90.0 ATTENTION DEFICIT HYPERACTIVITY DISORDER (ADHD), PREDOMINANTLY INATTENTIVE TYPE: ICD-10-CM

## 2022-07-27 RX ORDER — DEXTROAMPHETAMINE SACCHARATE, AMPHETAMINE ASPARTATE MONOHYDRATE, DEXTROAMPHETAMINE SULFATE AND AMPHETAMINE SULFATE 2.5; 2.5; 2.5; 2.5 MG/1; MG/1; MG/1; MG/1
10 CAPSULE, EXTENDED RELEASE ORAL EVERY MORNING
Qty: 30 CAPSULE | Refills: 0 | Status: SHIPPED | OUTPATIENT
Start: 2022-07-27 | End: 2022-08-29 | Stop reason: SDUPTHER

## 2022-07-27 NOTE — TELEPHONE ENCOUNTER
Patient is requesting medication refill. Please approve or deny this request.    Rx requested:  Requested Prescriptions     Pending Prescriptions Disp Refills    amphetamine-dextroamphetamine (ADDERALL XR) 10 MG extended release capsule 30 capsule 0     Sig: Take 1 capsule by mouth every morning for 30 days.          Last Office Visit:   3/16/2022      Next Visit Date:  Future Appointments   Date Time Provider Faustino Potter   8/19/2022 11:00 AM Kirt Suero DO [facility]   9/21/2022  1:45 PM Fariba Montana MD [facility]

## 2022-08-29 DIAGNOSIS — F90.0 ATTENTION DEFICIT HYPERACTIVITY DISORDER (ADHD), PREDOMINANTLY INATTENTIVE TYPE: ICD-10-CM

## 2022-08-29 RX ORDER — DEXTROAMPHETAMINE SACCHARATE, AMPHETAMINE ASPARTATE MONOHYDRATE, DEXTROAMPHETAMINE SULFATE AND AMPHETAMINE SULFATE 2.5; 2.5; 2.5; 2.5 MG/1; MG/1; MG/1; MG/1
10 CAPSULE, EXTENDED RELEASE ORAL EVERY MORNING
Qty: 30 CAPSULE | Refills: 0 | Status: SHIPPED | OUTPATIENT
Start: 2022-08-29 | End: 2022-09-26 | Stop reason: SDUPTHER

## 2022-09-21 ENCOUNTER — OFFICE VISIT (OUTPATIENT)
Dept: NEUROLOGY | Age: 52
End: 2022-09-21
Payer: MEDICAID

## 2022-09-21 VITALS
WEIGHT: 207 LBS | SYSTOLIC BLOOD PRESSURE: 110 MMHG | DIASTOLIC BLOOD PRESSURE: 80 MMHG | HEART RATE: 54 BPM | BODY MASS INDEX: 33.27 KG/M2 | HEIGHT: 66 IN

## 2022-09-21 DIAGNOSIS — Q21.12 PFO (PATENT FORAMEN OVALE): ICD-10-CM

## 2022-09-21 DIAGNOSIS — R41.3 MEMORY IMPAIRMENT: ICD-10-CM

## 2022-09-21 DIAGNOSIS — M54.16 LUMBAR RADICULOPATHY: ICD-10-CM

## 2022-09-21 DIAGNOSIS — I63.522 CEREBROVASCULAR ACCIDENT (CVA) DUE TO STENOSIS OF LEFT ANTERIOR CEREBRAL ARTERY (HCC): Primary | ICD-10-CM

## 2022-09-21 PROCEDURE — 99213 OFFICE O/P EST LOW 20 MIN: CPT | Performed by: PSYCHIATRY & NEUROLOGY

## 2022-09-21 RX ORDER — VITAMIN B COMPLEX
1 CAPSULE ORAL DAILY
COMMUNITY

## 2022-09-21 RX ORDER — VITAMIN E 268 MG
400 CAPSULE ORAL DAILY
COMMUNITY

## 2022-09-21 ASSESSMENT — ENCOUNTER SYMPTOMS
NAUSEA: 0
BACK PAIN: 0
VOMITING: 0
CHOKING: 0
SHORTNESS OF BREATH: 0
PHOTOPHOBIA: 0
COLOR CHANGE: 0
TROUBLE SWALLOWING: 0

## 2022-09-21 NOTE — PROGRESS NOTES
Subjective:      Patient ID: Aruna Smith is a 49 y.o. male who presents today for:  Chief Complaint   Patient presents with    6 Month Follow-Up     CVA, pt states hes doing great, no concerns at this time. HPI 52-year-old right-handed gentleman now with a history of CVA due to stenosis of the left iant ntracerebral artery. She is doing well and has ADHD. Eminently inattentive and we treat this with Adderall. He had a PFO which has been fixed. After the closure patient has not any further issues and continues to do well with Adderall. He had some fluctuating memory issues though had not quite developed a degenerative dementia.     Past Medical History:   Diagnosis Date    Anxiety     Coronary artery disease involving native coronary artery of native heart without angina pectoris 10/22/2020    CVA (cerebral vascular accident) ([address]     Hyperlipidemia     WOOD (obstructive sleep apnea)     PFO (patent foramen ovale)      Past Surgical History:   Procedure Laterality Date    DIAGNOSTIC CARDIAC CATH LAB PROCEDURE  02/27/2020    PERIPHERAL PERCUTANEOUS ARTERIAL INTERVENTION  02/27/2020     Social History     Socioeconomic History    Marital status:      Spouse name: Not on file    Number of children: Not on file    Years of education: Not on file    Highest education level: Not on file   Occupational History    Not on file   Tobacco Use    Smoking status: Every Day     Types: Cigars    Smokeless tobacco: Never   Substance and Sexual Activity    Alcohol use: Not Currently    Drug use: Not Currently    Sexual activity: Yes   Other Topics Concern    Not on file   Social History Narrative    Not on file     Social Determinants of Health     Financial Resource Strain: Not on file   Food Insecurity: Not on file   Transportation Needs: Not on file   Physical Activity: Not on file   Stress: Not on file   Social Connections: Not on file   Intimate Partner Violence: Not on file   Housing Stability: Not on file     History reviewed. No pertinent family history. No Known Allergies    Current Outpatient Medications   Medication Sig Dispense Refill    vitamin E 400 UNIT capsule Take 400 Units by mouth daily      b complex vitamins capsule Take 1 capsule by mouth daily      amphetamine-dextroamphetamine (ADDERALL XR) 10 MG extended release capsule Take 1 capsule by mouth every morning for 30 days. 30 capsule 0    aspirin 81 MG EC tablet Take 81 mg by mouth daily      Omega-3 Fatty Acids (FISH OIL) 1000 MG CAPS Take 1,000 mg by mouth 3 times daily      VITAMIN D, CHOLECALCIFEROL, PO Take 1,000 Units by mouth      vitamin C (ASCORBIC ACID) 500 MG tablet Take 500 mg by mouth daily      atorvastatin (LIPITOR) 40 MG tablet TAKE ONE TABLET BY MOUTH EVERY DAY 90 tablet 3    albuterol sulfate  (90 Base) MCG/ACT inhaler Inhale into the lungs      ALPRAZolam (XANAX) 0.5 MG tablet Take 0.5 mg by mouth every 4 hours as needed for Anxiety. No current facility-administered medications for this visit. Review of Systems   Constitutional:  Negative for fever. HENT:  Negative for ear pain, tinnitus and trouble swallowing. Eyes:  Negative for photophobia and visual disturbance. Respiratory:  Negative for choking and shortness of breath. Cardiovascular:  Negative for chest pain and palpitations. Gastrointestinal:  Negative for nausea and vomiting. Musculoskeletal:  Negative for back pain, gait problem, joint swelling, myalgias, neck pain and neck stiffness. Skin:  Negative for color change. Allergic/Immunologic: Negative for food allergies. Neurological:  Negative for dizziness, tremors, seizures, syncope, facial asymmetry, speech difficulty, weakness, light-headedness, numbness and headaches. Psychiatric/Behavioral:  Negative for behavioral problems, confusion, hallucinations and sleep disturbance.       Objective:   /80 (Site: Left Upper Arm, Position: Sitting, Cuff Size: Medium Adult) Pulse 54   Ht 5' 6\" (1.676 m)   Wt 207 lb (93.9 kg)   BMI 33.41 kg/m²     Physical Exam  Vitals reviewed. Eyes:      Pupils: Pupils are equal, round, and reactive to light. Cardiovascular:      Rate and Rhythm: Normal rate and regular rhythm. Heart sounds: No murmur heard. Pulmonary:      Effort: Pulmonary effort is normal.      Breath sounds: Normal breath sounds. Abdominal:      General: Bowel sounds are normal.   Musculoskeletal:         General: Normal range of motion. Cervical back: Normal range of motion. Skin:     General: Skin is warm. Neurological:      Mental Status: He is alert and oriented to person, place, and time. Cranial Nerves: No cranial nerve deficit. Sensory: No sensory deficit. Motor: No abnormal muscle tone. Coordination: Coordination normal.      Deep Tendon Reflexes: Reflexes are normal and symmetric. Babinski sign absent on the right side. Babinski sign absent on the left side. Psychiatric:         Mood and Affect: Mood normal.       No results found.     Lab Results   Component Value Date/Time    WBC 10.6 03/06/2020 07:21 AM    WBC 7.5 02/27/2020 03:15 PM    RBC 4.97 03/06/2020 07:21 AM    RBC 4.62 07/18/2018 03:48 AM    HGB 14.3 03/06/2020 07:21 AM    HCT 43.0 03/06/2020 07:21 AM    MCV 87 03/06/2020 07:21 AM    MCH 29.3 02/27/2020 03:15 PM    MCHC 33.3 03/06/2020 07:21 AM    RDW 13.3 02/27/2020 03:15 PM     03/06/2020 07:21 AM    MPV 11.1 07/18/2018 03:48 AM     Lab Results   Component Value Date/Time     03/06/2020 07:21 AM    K 4.7 03/06/2020 07:21 AM     03/06/2020 07:21 AM    CO2 23 02/27/2020 03:15 PM    BUN 16 02/27/2020 03:15 PM    CREATININE 1.06 03/06/2020 07:21 AM    GFRAA >60 03/06/2020 07:21 AM    LABGLOM >60 03/06/2020 07:21 AM    GLUCOSE 103 03/06/2020 07:21 AM    PROT 7.0 02/27/2020 03:15 PM    LABALBU 4.1 02/27/2020 03:15 PM    CALCIUM 9.1 03/06/2020 07:21 AM    BILITOT 0.3 02/27/2020 03:15 PM    ALKPHOS 66 02/27/2020 03:15 PM    AST 21 02/27/2020 03:15 PM    ALT 22 02/27/2020 03:15 PM     Lab Results   Component Value Date/Time    PROTIME 11.4 03/05/2020 11:30 AM    INR 1.0 03/05/2020 11:30 AM     No results found for: TSH, [id number], FOLATE, FERRITIN, IRON, TIBC, PTRFSAT, RETICCOUNT, TSH, FREET4  Lab Results   Component Value Date/Time    TRIG 82 07/18/2018 03:48 AM    HDL 58 07/18/2018 03:48 AM    LDLCALC 145 07/18/2018 03:48 AM    LABVLDL 16 07/18/2018 03:48 AM     No results found for: LABAMPH, BARBSCNU, LABBENZ, CANNAB, COCAINESCRN, LABMETH, OPIATESCREENURINE, PHENCYCLIDINESCREENURINE, PPXUR, ETOH  No results found for: LITHIUM, DILFRTOT, VALPROATE    Assessment:       Diagnosis Orders   1. Cerebrovascular accident (CVA) due to stenosis of left anterior cerebral artery ([facility] [address]        2. Lumbar radiculopathy        3. PFO (patent foramen ovale)        4. Memory impairment        Left anterior cerebral artery stroke with a PFO which was fixed many years ago. Patient actually doing very well. He had developed some degree of ischemia and does very well with 10 mg of Adderall. He does have some inattentive ADD. Patient has improved considerably and did complain some memory issues though I do not think that this is a vascular dementia as patient is functioning very well. This may be from the anterior stroke. Keep appointments as he is on Adderall      Plan:      No orders of the defined types were placed in this encounter. No orders of the defined types were placed in this encounter. No follow-ups on file.       Clemencia Richard MD

## 2022-09-26 DIAGNOSIS — F90.0 ATTENTION DEFICIT HYPERACTIVITY DISORDER (ADHD), PREDOMINANTLY INATTENTIVE TYPE: ICD-10-CM

## 2022-09-26 RX ORDER — DEXTROAMPHETAMINE SACCHARATE, AMPHETAMINE ASPARTATE MONOHYDRATE, DEXTROAMPHETAMINE SULFATE AND AMPHETAMINE SULFATE 2.5; 2.5; 2.5; 2.5 MG/1; MG/1; MG/1; MG/1
10 CAPSULE, EXTENDED RELEASE ORAL EVERY MORNING
Qty: 30 CAPSULE | Refills: 0 | Status: SHIPPED | OUTPATIENT
Start: 2022-09-26 | End: 2022-10-31 | Stop reason: SDUPTHER

## 2022-10-31 DIAGNOSIS — F90.0 ATTENTION DEFICIT HYPERACTIVITY DISORDER (ADHD), PREDOMINANTLY INATTENTIVE TYPE: ICD-10-CM

## 2022-10-31 RX ORDER — DEXTROAMPHETAMINE SACCHARATE, AMPHETAMINE ASPARTATE MONOHYDRATE, DEXTROAMPHETAMINE SULFATE AND AMPHETAMINE SULFATE 2.5; 2.5; 2.5; 2.5 MG/1; MG/1; MG/1; MG/1
10 CAPSULE, EXTENDED RELEASE ORAL EVERY MORNING
Qty: 30 CAPSULE | Refills: 0 | Status: SHIPPED | OUTPATIENT
Start: 2022-10-31 | End: 2022-11-28 | Stop reason: SDUPTHER

## 2022-10-31 NOTE — TELEPHONE ENCOUNTER
Patient is requesting medication refill. Please approve or deny this request.    Rx requested:  Requested Prescriptions     Pending Prescriptions Disp Refills    amphetamine-dextroamphetamine (ADDERALL XR) 10 MG extended release capsule 30 capsule 0     Sig: Take 1 capsule by mouth every morning for 30 days.          Last Office Visit:   9/21/2022      Next Visit Date:  Future Appointments   Date Time Provider Faustino Potter   3/22/2023  3:30 PM Bertin Morris MD [facility]

## 2022-11-28 DIAGNOSIS — F90.0 ATTENTION DEFICIT HYPERACTIVITY DISORDER (ADHD), PREDOMINANTLY INATTENTIVE TYPE: ICD-10-CM

## 2022-11-28 RX ORDER — DEXTROAMPHETAMINE SACCHARATE, AMPHETAMINE ASPARTATE MONOHYDRATE, DEXTROAMPHETAMINE SULFATE AND AMPHETAMINE SULFATE 2.5; 2.5; 2.5; 2.5 MG/1; MG/1; MG/1; MG/1
10 CAPSULE, EXTENDED RELEASE ORAL EVERY MORNING
Qty: 30 CAPSULE | Refills: 0 | Status: SHIPPED | OUTPATIENT
Start: 2022-11-28 | End: 2022-12-28

## 2022-12-05 DIAGNOSIS — F90.0 ATTENTION DEFICIT HYPERACTIVITY DISORDER (ADHD), PREDOMINANTLY INATTENTIVE TYPE: Primary | ICD-10-CM

## 2022-12-05 NOTE — TELEPHONE ENCOUNTER
Patient is requesting medication refill. Please approve or deny this request.    Rx requested:  Requested Prescriptions     Pending Prescriptions Disp Refills    amphetamine-dextroamphetamine (ADDERALL XR) 15 MG extended release capsule 30 capsule 0     Sig: Take 1 capsule by mouth daily for 30 days.          Last Office Visit:   9/21/2022      Next Visit Date:  Future Appointments   Date Time Provider Faustino Potter   3/22/2023  3:30 PM Steven Redman MD [facility] -       Pharmacy is out of xr 10mg and per dr. Hai Mcgraw it is okay to send xr 15mg

## 2022-12-06 RX ORDER — DEXTROAMPHETAMINE SACCHARATE, AMPHETAMINE ASPARTATE MONOHYDRATE, DEXTROAMPHETAMINE SULFATE AND AMPHETAMINE SULFATE 3.75; 3.75; 3.75; 3.75 MG/1; MG/1; MG/1; MG/1
15 CAPSULE, EXTENDED RELEASE ORAL DAILY
Qty: 30 CAPSULE | Refills: 0 | Status: SHIPPED | OUTPATIENT
Start: 2022-12-06 | End: 2023-01-05

## 2023-01-09 DIAGNOSIS — F90.0 ATTENTION DEFICIT HYPERACTIVITY DISORDER (ADHD), PREDOMINANTLY INATTENTIVE TYPE: ICD-10-CM

## 2023-01-09 RX ORDER — DEXTROAMPHETAMINE SACCHARATE, AMPHETAMINE ASPARTATE MONOHYDRATE, DEXTROAMPHETAMINE SULFATE AND AMPHETAMINE SULFATE 3.75; 3.75; 3.75; 3.75 MG/1; MG/1; MG/1; MG/1
15 CAPSULE, EXTENDED RELEASE ORAL DAILY
Qty: 30 CAPSULE | Refills: 0 | Status: SHIPPED | OUTPATIENT
Start: 2023-01-09 | End: 2023-02-08

## 2023-01-09 NOTE — TELEPHONE ENCOUNTER
Pt is requesting medication refill. Please approve or deny this request.    Rx requested:  Requested Prescriptions     Pending Prescriptions Disp Refills    amphetamine-dextroamphetamine (ADDERALL XR) 15 MG extended release capsule 30 capsule 0     Sig: Take 1 capsule by mouth daily for 30 days.          Last Office Visit:   9/21/2022      Next Visit Date:  Future Appointments   Date Time Provider Faustino Potter   3/22/2023  3:30 PM MD [facility] -

## 2023-02-06 DIAGNOSIS — F90.0 ATTENTION DEFICIT HYPERACTIVITY DISORDER (ADHD), PREDOMINANTLY INATTENTIVE TYPE: ICD-10-CM

## 2023-02-06 RX ORDER — DEXTROAMPHETAMINE SACCHARATE, AMPHETAMINE ASPARTATE MONOHYDRATE, DEXTROAMPHETAMINE SULFATE AND AMPHETAMINE SULFATE 3.75; 3.75; 3.75; 3.75 MG/1; MG/1; MG/1; MG/1
15 CAPSULE, EXTENDED RELEASE ORAL DAILY
Qty: 30 CAPSULE | Refills: 0 | Status: SHIPPED | OUTPATIENT
Start: 2023-02-06 | End: 2023-03-08

## 2023-02-06 NOTE — TELEPHONE ENCOUNTER
Pt is requesting medication refill. Please approve or deny this request.    Rx requested:  Requested Prescriptions     Pending Prescriptions Disp Refills    amphetamine-dextroamphetamine (ADDERALL XR) 15 MG extended release capsule 30 capsule 0     Sig: Take 1 capsule by mouth daily for 30 days.          Last Office Visit:   9/21/2022      Next Visit Date:  Future Appointments   Date Time Provider Faustino Potter   3/22/2023  3:30 PM MD Georgia Manjarrez [facility] -

## 2023-02-16 DIAGNOSIS — E78.2 MIXED HYPERLIPIDEMIA: ICD-10-CM

## 2023-02-16 RX ORDER — ATORVASTATIN CALCIUM 40 MG/1
TABLET, FILM COATED ORAL
Qty: 90 TABLET | Refills: 3 | Status: SHIPPED | OUTPATIENT
Start: 2023-02-16

## 2023-02-16 NOTE — TELEPHONE ENCOUNTER
Requesting medication refill.  Please approve or deny this request.    Rx requested:  Requested Prescriptions     Pending Prescriptions Disp Refills    atorvastatin (LIPITOR) 40 MG tablet 90 tablet 3     Sig: TAKE ONE TABLET BY MOUTH EVERY DAY         Last Office Visit:   3/3/2022      Next Visit Date:  Future Appointments   Date Time Provider Faustino Potter   3/22/2023  3:30 PM MD Brenna Pateled [facility] -

## 2023-03-08 DIAGNOSIS — F90.0 ATTENTION DEFICIT HYPERACTIVITY DISORDER (ADHD), PREDOMINANTLY INATTENTIVE TYPE: ICD-10-CM

## 2023-03-08 RX ORDER — DEXTROAMPHETAMINE SACCHARATE, AMPHETAMINE ASPARTATE MONOHYDRATE, DEXTROAMPHETAMINE SULFATE AND AMPHETAMINE SULFATE 3.75; 3.75; 3.75; 3.75 MG/1; MG/1; MG/1; MG/1
15 CAPSULE, EXTENDED RELEASE ORAL DAILY
Qty: 30 CAPSULE | Refills: 0 | Status: SHIPPED | OUTPATIENT
Start: 2023-03-08 | End: 2023-04-17 | Stop reason: SDUPTHER

## 2023-03-22 ENCOUNTER — OFFICE VISIT (OUTPATIENT)
Dept: NEUROLOGY | Age: 53
End: 2023-03-22
Payer: MEDICAID

## 2023-03-22 VITALS
HEART RATE: 74 BPM | SYSTOLIC BLOOD PRESSURE: 116 MMHG | DIASTOLIC BLOOD PRESSURE: 80 MMHG | WEIGHT: 205.5 LBS | BODY MASS INDEX: 33.17 KG/M2

## 2023-03-22 DIAGNOSIS — R41.3 MEMORY IMPAIRMENT: ICD-10-CM

## 2023-03-22 DIAGNOSIS — F90.0 ATTENTION DEFICIT HYPERACTIVITY DISORDER (ADHD), PREDOMINANTLY INATTENTIVE TYPE: ICD-10-CM

## 2023-03-22 DIAGNOSIS — Q21.12 PFO (PATENT FORAMEN OVALE): ICD-10-CM

## 2023-03-22 DIAGNOSIS — I63.522 CEREBROVASCULAR ACCIDENT (CVA) DUE TO STENOSIS OF LEFT ANTERIOR CEREBRAL ARTERY (HCC): Primary | ICD-10-CM

## 2023-03-22 DIAGNOSIS — M54.16 LUMBAR RADICULOPATHY: ICD-10-CM

## 2023-03-22 PROCEDURE — 99214 OFFICE O/P EST MOD 30 MIN: CPT | Performed by: PSYCHIATRY & NEUROLOGY

## 2023-03-22 PROCEDURE — 3078F DIAST BP <80 MM HG: CPT | Performed by: PSYCHIATRY & NEUROLOGY

## 2023-03-22 PROCEDURE — 3074F SYST BP LT 130 MM HG: CPT | Performed by: PSYCHIATRY & NEUROLOGY

## 2023-03-22 ASSESSMENT — ENCOUNTER SYMPTOMS
SHORTNESS OF BREATH: 0
BACK PAIN: 0
NAUSEA: 0
PHOTOPHOBIA: 0
COLOR CHANGE: 0
VOMITING: 0
CHOKING: 0
TROUBLE SWALLOWING: 0

## 2023-03-22 NOTE — PROGRESS NOTES
Subjective:      Patient ID: Eleni Sandoval is a 47 y.o. male who presents today for:  Chief Complaint   Patient presents with    6 Month Follow-Up     Pt states he feels great and isn't having any issues. HPI 69-year-old gentleman with history of CVA. Patient was seen last in September. Patient had a PFO as well. Has been fixed. 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 Doing well on Adderall he has very minimal procedures of his stroke. Is not developed a vascular dementia. Is independent. Patient was actually doing somewhat better on Adderall Exar 10 mg and then we increase this to 15 mg he is doing much better now.   Patient is no hyperactivity or rage or palpitations feels his memory is much improved    Past Medical History:   Diagnosis Date    Anxiety     Coronary artery disease involving native coronary artery of native heart without angina pectoris 10/22/2020    CVA (cerebral vascular accident) ([address]     Hyperlipidemia     WOOD (obstructive sleep apnea)     PFO (patent foramen ovale)      Past Surgical History:   Procedure Laterality Date    DIAGNOSTIC CARDIAC CATH LAB PROCEDURE  02/27/2020    PERIPHERAL PERCUTANEOUS ARTERIAL INTERVENTION  02/27/2020     Social History     Socioeconomic History    Marital status:      Spouse name: Not on file    Number of children: Not on file    Years of education: Not on file    Highest education level: Not on file   Occupational History    Not on file   Tobacco Use    Smoking status: Every Day     Types: Cigars    Smokeless tobacco: Never   Substance and Sexual Activity    Alcohol use: Not Currently    Drug use: Not Currently    Sexual activity: Yes   Other Topics Concern    Not on file   Social History Narrative    Not on file     Social Determinants of Health     Financial Resource Strain: Not on file   Food Insecurity: Not on file   Transportation Needs: Not on file   Physical Activity: Not on file   Stress: Not on file

## 2023-05-18 DIAGNOSIS — F90.0 ATTENTION DEFICIT HYPERACTIVITY DISORDER (ADHD), PREDOMINANTLY INATTENTIVE TYPE: ICD-10-CM

## 2023-05-18 RX ORDER — DEXTROAMPHETAMINE SACCHARATE, AMPHETAMINE ASPARTATE MONOHYDRATE, DEXTROAMPHETAMINE SULFATE AND AMPHETAMINE SULFATE 3.75; 3.75; 3.75; 3.75 MG/1; MG/1; MG/1; MG/1
15 CAPSULE, EXTENDED RELEASE ORAL DAILY
Qty: 30 CAPSULE | Refills: 0 | Status: SHIPPED | OUTPATIENT
Start: 2023-05-18 | End: 2023-06-20 | Stop reason: SDUPTHER

## 2023-06-01 ENCOUNTER — OFFICE VISIT (OUTPATIENT)
Dept: CARDIOLOGY CLINIC | Age: 53
End: 2023-06-01
Payer: MEDICAID

## 2023-06-01 VITALS
SYSTOLIC BLOOD PRESSURE: 130 MMHG | OXYGEN SATURATION: 99 % | HEART RATE: 69 BPM | WEIGHT: 210 LBS | HEIGHT: 72 IN | BODY MASS INDEX: 28.44 KG/M2 | DIASTOLIC BLOOD PRESSURE: 72 MMHG

## 2023-06-01 DIAGNOSIS — I25.10 CORONARY ARTERY DISEASE INVOLVING NATIVE CORONARY ARTERY OF NATIVE HEART WITHOUT ANGINA PECTORIS: Primary | ICD-10-CM

## 2023-06-01 DIAGNOSIS — E78.00 PURE HYPERCHOLESTEROLEMIA, UNSPECIFIED: ICD-10-CM

## 2023-06-01 DIAGNOSIS — I10 ESSENTIAL (PRIMARY) HYPERTENSION: ICD-10-CM

## 2023-06-01 DIAGNOSIS — Q21.12 PFO (PATENT FORAMEN OVALE): ICD-10-CM

## 2023-06-01 DIAGNOSIS — E78.2 MIXED HYPERLIPIDEMIA: ICD-10-CM

## 2023-06-01 PROBLEM — Z72.0 TOBACCO USE: Status: RESOLVED | Noted: 2018-12-05 | Resolved: 2023-06-01

## 2023-06-01 PROBLEM — R63.5 ABNORMAL WEIGHT GAIN: Status: RESOLVED | Noted: 2019-01-29 | Resolved: 2023-06-01

## 2023-06-01 PROBLEM — J98.01 BRONCHOSPASM: Status: RESOLVED | Noted: 2020-11-18 | Resolved: 2023-06-01

## 2023-06-01 PROBLEM — R42 DIZZINESS AND GIDDINESS: Status: RESOLVED | Noted: 2018-09-11 | Resolved: 2023-06-01

## 2023-06-01 PROBLEM — R06.00 DYSPNEA: Status: RESOLVED | Noted: 2019-02-01 | Resolved: 2023-06-01

## 2023-06-01 PROBLEM — R53.83 OTHER FATIGUE: Status: RESOLVED | Noted: 2019-02-01 | Resolved: 2023-06-01

## 2023-06-01 PROBLEM — R73.9 HYPERGLYCEMIA: Status: RESOLVED | Noted: 2020-11-18 | Resolved: 2023-06-01

## 2023-06-01 PROBLEM — J30.9 ALLERGIC RHINITIS: Status: RESOLVED | Noted: 2020-11-18 | Resolved: 2023-06-01

## 2023-06-01 PROBLEM — R29.810 FACIAL WEAKNESS: Status: RESOLVED | Noted: 2018-07-17 | Resolved: 2023-06-01

## 2023-06-01 PROBLEM — M25.559 HIP PAIN: Status: RESOLVED | Noted: 2020-11-18 | Resolved: 2023-06-01

## 2023-06-01 PROBLEM — R11.2 NAUSEA WITH VOMITING, UNSPECIFIED: Status: RESOLVED | Noted: 2018-12-05 | Resolved: 2023-06-01

## 2023-06-01 PROBLEM — H60.329: Status: RESOLVED | Noted: 2020-11-18 | Resolved: 2023-06-01

## 2023-06-01 PROBLEM — R27.0 ATAXIA: Status: RESOLVED | Noted: 2020-11-18 | Resolved: 2023-06-01

## 2023-06-01 PROBLEM — R20.0 ANESTHESIA OF SKIN: Status: RESOLVED | Noted: 2018-09-11 | Resolved: 2023-06-01

## 2023-06-01 PROBLEM — R00.1 BRADYCARDIA, UNSPECIFIED: Status: RESOLVED | Noted: 2019-02-01 | Resolved: 2023-06-01

## 2023-06-01 PROBLEM — R00.1 SINUS BRADYCARDIA: Status: RESOLVED | Noted: 2020-11-18 | Resolved: 2023-06-01

## 2023-06-01 PROBLEM — R94.31 ABNORMAL ELECTROCARDIOGRAM: Status: RESOLVED | Noted: 2018-09-25 | Resolved: 2023-06-01

## 2023-06-01 PROCEDURE — 3078F DIAST BP <80 MM HG: CPT | Performed by: INTERNAL MEDICINE

## 2023-06-01 PROCEDURE — 99214 OFFICE O/P EST MOD 30 MIN: CPT | Performed by: INTERNAL MEDICINE

## 2023-06-01 PROCEDURE — 93000 ELECTROCARDIOGRAM COMPLETE: CPT | Performed by: INTERNAL MEDICINE

## 2023-06-01 PROCEDURE — 3075F SYST BP GE 130 - 139MM HG: CPT | Performed by: INTERNAL MEDICINE

## 2023-06-01 ASSESSMENT — ENCOUNTER SYMPTOMS
NAUSEA: 0
GASTROINTESTINAL NEGATIVE: 1
ABDOMINAL PAIN: 0
SHORTNESS OF BREATH: 0
WHEEZING: 0
EYES NEGATIVE: 1
VOMITING: 0

## 2023-06-01 NOTE — PROGRESS NOTES
2023  CC: CAD     HPI:    Ainsley Proctor (:  1970) has requested an audio/video evaluation for the following concern(s): PFO    S/p PCI of mid LAD. S/p PFO closure with [place] Cardioform. Pt denies chest pain, dyspnea, dyspnea on exertion, change in exercise capacity, fatigue,  nausea, vomiting, diarrhea, constipation, motor weakness, insomnia, weight loss, syncope, dizziness, lightheadedness, palpitations, PND, orthopnea, or claudication. No nitro use. BP and hr are good. CAD is stable. No LE discoloration or ulcers. No LE edema. No CHF type symptoms. Lipid profile is normal. No recent hospitalization. No change in meds. On DAPT. No bleeding issues. Has had some chest fluttering at times 1-2xday. No neuro type symptoms. 10/22/2020: Patient has been experiencing increased fatigue tiredness for the past couple weeks. He has been stressed out and working more. With history of coronary disease status post mid LAD PCI and PFO closure in 2020. Patient also complains of lightheadedness/dizziness. Blood pressure is 112/84 heart rate of 58 bpm.  Patient is on aspirin and Plavix. He is also on a statin. Status post limited echo in 2020 with no evidence of PFO. That is post 48-hour Holter monitor with which was essentially negative and normal.  EKG with sinus bradycardia today at heart rate of 50 bpm, no ischemia. States he is having some memory issues. 21:  Developed LH/dizz at times. With history of coronary disease status post mid LAD PCI and PFO closure in 2020. Pt denies chest pain, dyspnea, dyspnea on exertion, change in exercise capacity, fatigue,  nausea, vomiting, diarrhea, constipation, motor weakness, insomnia, weight loss, syncope, dizziness, lightheadedness, palpitations, PND, orthopnea, or claudication. No nitro use. BP and hr are good. CAD is stable. No LE discoloration or ulcers. No LE edema. No CHF type symptoms.  Lipid profile is normal. No recent

## 2023-06-19 DIAGNOSIS — F90.0 ATTENTION DEFICIT HYPERACTIVITY DISORDER (ADHD), PREDOMINANTLY INATTENTIVE TYPE: ICD-10-CM

## 2023-06-20 RX ORDER — DEXTROAMPHETAMINE SACCHARATE, AMPHETAMINE ASPARTATE MONOHYDRATE, DEXTROAMPHETAMINE SULFATE AND AMPHETAMINE SULFATE 3.75; 3.75; 3.75; 3.75 MG/1; MG/1; MG/1; MG/1
15 CAPSULE, EXTENDED RELEASE ORAL DAILY
Qty: 30 CAPSULE | Refills: 0 | Status: SHIPPED | OUTPATIENT
Start: 2023-06-20 | End: 2023-07-20

## 2023-07-18 DIAGNOSIS — F90.0 ATTENTION DEFICIT HYPERACTIVITY DISORDER (ADHD), PREDOMINANTLY INATTENTIVE TYPE: ICD-10-CM

## 2023-07-18 NOTE — TELEPHONE ENCOUNTER
Patient is requesting medication refill. Please approve or deny this request.    Rx requested:  Requested Prescriptions     Pending Prescriptions Disp Refills    amphetamine-dextroamphetamine (ADDERALL XR) 15 MG extended release capsule 30 capsule 0     Sig: Take 1 capsule by mouth daily for 30 days.          Last Office Visit:   3/22/2023      Next Visit Date:  Future Appointments   Date Time Provider [address]   9/18/2023  3:15 PM MD Nancy Parikh Neurology -   6/13/2024  1:30 PM Kirt Mason, [facility]

## 2023-07-19 RX ORDER — DEXTROAMPHETAMINE SACCHARATE, AMPHETAMINE ASPARTATE MONOHYDRATE, DEXTROAMPHETAMINE SULFATE AND AMPHETAMINE SULFATE 3.75; 3.75; 3.75; 3.75 MG/1; MG/1; MG/1; MG/1
15 CAPSULE, EXTENDED RELEASE ORAL DAILY
Qty: 30 CAPSULE | Refills: 0 | Status: SHIPPED | OUTPATIENT
Start: 2023-07-19 | End: 2023-08-18

## 2023-08-17 DIAGNOSIS — F90.0 ATTENTION DEFICIT HYPERACTIVITY DISORDER (ADHD), PREDOMINANTLY INATTENTIVE TYPE: ICD-10-CM

## 2023-08-18 RX ORDER — DEXTROAMPHETAMINE SACCHARATE, AMPHETAMINE ASPARTATE MONOHYDRATE, DEXTROAMPHETAMINE SULFATE AND AMPHETAMINE SULFATE 3.75; 3.75; 3.75; 3.75 MG/1; MG/1; MG/1; MG/1
15 CAPSULE, EXTENDED RELEASE ORAL DAILY
Qty: 30 CAPSULE | Refills: 0 | Status: SHIPPED | OUTPATIENT
Start: 2023-08-18 | End: 2023-09-17

## 2023-09-15 PROBLEM — G47.33 OBSTRUCTIVE SLEEP APNEA SYNDROME: Status: RESOLVED | Noted: 2019-02-01 | Resolved: 2023-09-15

## 2023-09-18 ENCOUNTER — OFFICE VISIT (OUTPATIENT)
Dept: NEUROLOGY | Age: 53
End: 2023-09-18
Payer: MEDICAID

## 2023-09-18 VITALS
WEIGHT: 202 LBS | HEART RATE: 78 BPM | DIASTOLIC BLOOD PRESSURE: 80 MMHG | BODY MASS INDEX: 27.4 KG/M2 | SYSTOLIC BLOOD PRESSURE: 118 MMHG

## 2023-09-18 DIAGNOSIS — I63.512 CEREBROVASCULAR ACCIDENT (CVA) DUE TO STENOSIS OF LEFT MIDDLE CEREBRAL ARTERY (HCC): ICD-10-CM

## 2023-09-18 DIAGNOSIS — F90.0 ATTENTION DEFICIT HYPERACTIVITY DISORDER (ADHD), PREDOMINANTLY INATTENTIVE TYPE: Primary | ICD-10-CM

## 2023-09-18 DIAGNOSIS — M54.15 RADICULOPATHY, THORACOLUMBAR REGION: ICD-10-CM

## 2023-09-18 DIAGNOSIS — Q21.12 PFO (PATENT FORAMEN OVALE): ICD-10-CM

## 2023-09-18 PROCEDURE — 3078F DIAST BP <80 MM HG: CPT | Performed by: PSYCHIATRY & NEUROLOGY

## 2023-09-18 PROCEDURE — 99214 OFFICE O/P EST MOD 30 MIN: CPT | Performed by: PSYCHIATRY & NEUROLOGY

## 2023-09-18 PROCEDURE — 4004F PT TOBACCO SCREEN RCVD TLK: CPT | Performed by: PSYCHIATRY & NEUROLOGY

## 2023-09-18 PROCEDURE — 3017F COLORECTAL CA SCREEN DOC REV: CPT | Performed by: PSYCHIATRY & NEUROLOGY

## 2023-09-18 PROCEDURE — 3074F SYST BP LT 130 MM HG: CPT | Performed by: PSYCHIATRY & NEUROLOGY

## 2023-09-18 PROCEDURE — G8427 DOCREV CUR MEDS BY ELIG CLIN: HCPCS | Performed by: PSYCHIATRY & NEUROLOGY

## 2023-09-18 PROCEDURE — G8417 CALC BMI ABV UP PARAM F/U: HCPCS | Performed by: PSYCHIATRY & NEUROLOGY

## 2023-09-18 RX ORDER — DEXTROAMPHETAMINE SACCHARATE, AMPHETAMINE ASPARTATE MONOHYDRATE, DEXTROAMPHETAMINE SULFATE AND AMPHETAMINE SULFATE 3.75; 3.75; 3.75; 3.75 MG/1; MG/1; MG/1; MG/1
15 CAPSULE, EXTENDED RELEASE ORAL DAILY
Qty: 30 CAPSULE | Refills: 0 | Status: SHIPPED | OUTPATIENT
Start: 2023-09-18 | End: 2023-10-18

## 2023-09-18 ASSESSMENT — ENCOUNTER SYMPTOMS
CHOKING: 0
VOMITING: 0
COLOR CHANGE: 0
BACK PAIN: 0
PHOTOPHOBIA: 0
TROUBLE SWALLOWING: 0
NAUSEA: 0
SHORTNESS OF BREATH: 0

## 2023-09-18 NOTE — PROGRESS NOTES
Subjective:      Patient ID: Jamison Ruby is a 47 y.o. male who presents today for:  Chief Complaint   Patient presents with    6 Month Follow-Up     Pt states things have been going well and has not had any new or worsening issues. Pt states the dosing of his adderall is working well for him. HPI 52-year-old right-handed gentleman with a known history of stroke patient had a PFO which was fixed and he has history of ADD and continues on Adderall. He actually is doing quite well and has not developed any vascular issues. When last seen we had increased his Adderall to 15 mg. He had no hyperactivity.     Past Medical History:   Diagnosis Date    Anxiety     Coronary artery disease involving native coronary artery of native heart without angina pectoris 10/22/2020    CVA (cerebral vascular accident) ([address])     Hyperlipidemia     WOOD (obstructive sleep apnea)     PFO (patent foramen ovale)      Past Surgical History:   Procedure Laterality Date    DIAGNOSTIC CARDIAC CATH LAB PROCEDURE  02/27/2020    PERIPHERAL PERCUTANEOUS ARTERIAL INTERVENTION  02/27/2020     Social History     Socioeconomic History    Marital status:      Spouse name: Not on file    Number of children: Not on file    Years of education: Not on file    Highest education level: Not on file   Occupational History    Not on file   Tobacco Use    Smoking status: Every Day     Types: Cigars    Smokeless tobacco: Never   Substance and Sexual Activity    Alcohol use: Not Currently    Drug use: Not Currently    Sexual activity: Yes   Other Topics Concern    Not on file   Social History Narrative    Not on file     Social Determinants of Health     Financial Resource Strain: Not on file   Food Insecurity: Not on file   Transportation Needs: Not on file   Physical Activity: Not on file   Stress: Not on file   Social Connections: Not on file   Intimate Partner Violence: Not on file   Housing Stability: Not on file     No family history on

## 2023-09-26 DIAGNOSIS — F90.0 ATTENTION DEFICIT HYPERACTIVITY DISORDER (ADHD), PREDOMINANTLY INATTENTIVE TYPE: ICD-10-CM

## 2023-09-26 RX ORDER — DEXTROAMPHETAMINE SACCHARATE, AMPHETAMINE ASPARTATE MONOHYDRATE, DEXTROAMPHETAMINE SULFATE AND AMPHETAMINE SULFATE 3.75; 3.75; 3.75; 3.75 MG/1; MG/1; MG/1; MG/1
15 CAPSULE, EXTENDED RELEASE ORAL DAILY
Qty: 30 CAPSULE | Refills: 0 | Status: SHIPPED | OUTPATIENT
Start: 2023-09-26 | End: 2023-10-26

## 2023-09-26 NOTE — TELEPHONE ENCOUNTER
Requested Prescriptions     Pending Prescriptions Disp Refills    amphetamine-dextroamphetamine (ADDERALL XR) 15 MG extended release capsule 30 capsule 0     Sig: Take 1 capsule by mouth daily for 30 days. Script on 9/18 went to wrong pharmacy. The phone number to [facility] in [place] keeps ringing busy and I cannot reach them to cancel the script.  Per [facility] report it was last filled 8/18/2023

## 2023-10-30 DIAGNOSIS — F90.0 ATTENTION DEFICIT HYPERACTIVITY DISORDER (ADHD), PREDOMINANTLY INATTENTIVE TYPE: ICD-10-CM

## 2023-10-30 RX ORDER — DEXTROAMPHETAMINE SACCHARATE, AMPHETAMINE ASPARTATE MONOHYDRATE, DEXTROAMPHETAMINE SULFATE AND AMPHETAMINE SULFATE 3.75; 3.75; 3.75; 3.75 MG/1; MG/1; MG/1; MG/1
15 CAPSULE, EXTENDED RELEASE ORAL DAILY
Qty: 30 CAPSULE | Refills: 0 | Status: SHIPPED | OUTPATIENT
Start: 2023-10-30 | End: 2023-11-29

## 2023-12-01 DIAGNOSIS — F90.0 ATTENTION DEFICIT HYPERACTIVITY DISORDER (ADHD), PREDOMINANTLY INATTENTIVE TYPE: ICD-10-CM

## 2023-12-04 RX ORDER — DEXTROAMPHETAMINE SACCHARATE, AMPHETAMINE ASPARTATE MONOHYDRATE, DEXTROAMPHETAMINE SULFATE AND AMPHETAMINE SULFATE 3.75; 3.75; 3.75; 3.75 MG/1; MG/1; MG/1; MG/1
15 CAPSULE, EXTENDED RELEASE ORAL DAILY
Qty: 30 CAPSULE | Refills: 0 | Status: SHIPPED | OUTPATIENT
Start: 2023-12-04 | End: 2024-01-03

## 2024-01-03 DIAGNOSIS — F90.0 ATTENTION DEFICIT HYPERACTIVITY DISORDER (ADHD), PREDOMINANTLY INATTENTIVE TYPE: ICD-10-CM

## 2024-01-03 RX ORDER — DEXTROAMPHETAMINE SACCHARATE, AMPHETAMINE ASPARTATE MONOHYDRATE, DEXTROAMPHETAMINE SULFATE AND AMPHETAMINE SULFATE 3.75; 3.75; 3.75; 3.75 MG/1; MG/1; MG/1; MG/1
15 CAPSULE, EXTENDED RELEASE ORAL DAILY
Qty: 30 CAPSULE | Refills: 0 | Status: SHIPPED | OUTPATIENT
Start: 2024-01-03 | End: 2024-01-31 | Stop reason: SDUPTHER

## 2024-01-03 NOTE — TELEPHONE ENCOUNTER
Patient is requesting medication refill. Please approve or deny this request.    Rx requested:  Requested Prescriptions     Pending Prescriptions Disp Refills    amphetamine-dextroamphetamine (ADDERALL XR) 15 MG extended release capsule 30 capsule 0     Sig: Take 1 capsule by mouth daily for 30 days.         Last Office Visit:   9/18/2023      Next Visit Date:  Future Appointments   Date Time Provider Department Center   3/18/2024  3:00 PM Julien Olivo MD [facility] -   6/13/2024  1:30 PM Kirt Canseco DO [facility]

## 2024-01-31 DIAGNOSIS — F90.0 ATTENTION DEFICIT HYPERACTIVITY DISORDER (ADHD), PREDOMINANTLY INATTENTIVE TYPE: ICD-10-CM

## 2024-01-31 RX ORDER — DEXTROAMPHETAMINE SACCHARATE, AMPHETAMINE ASPARTATE MONOHYDRATE, DEXTROAMPHETAMINE SULFATE AND AMPHETAMINE SULFATE 3.75; 3.75; 3.75; 3.75 MG/1; MG/1; MG/1; MG/1
15 CAPSULE, EXTENDED RELEASE ORAL DAILY
Qty: 30 CAPSULE | Refills: 0 | Status: SHIPPED | OUTPATIENT
Start: 2024-01-31 | End: 2024-03-04 | Stop reason: SDUPTHER

## 2024-03-04 DIAGNOSIS — F90.0 ATTENTION DEFICIT HYPERACTIVITY DISORDER (ADHD), PREDOMINANTLY INATTENTIVE TYPE: ICD-10-CM

## 2024-03-04 RX ORDER — DEXTROAMPHETAMINE SACCHARATE, AMPHETAMINE ASPARTATE MONOHYDRATE, DEXTROAMPHETAMINE SULFATE AND AMPHETAMINE SULFATE 3.75; 3.75; 3.75; 3.75 MG/1; MG/1; MG/1; MG/1
15 CAPSULE, EXTENDED RELEASE ORAL DAILY
Qty: 30 CAPSULE | Refills: 0 | Status: SHIPPED | OUTPATIENT
Start: 2024-03-04 | End: 2024-04-03

## 2024-04-10 ENCOUNTER — OFFICE VISIT (OUTPATIENT)
Dept: NEUROLOGY | Age: 54
End: 2024-04-10
Payer: MEDICAID

## 2024-04-10 VITALS
SYSTOLIC BLOOD PRESSURE: 138 MMHG | WEIGHT: 211.8 LBS | DIASTOLIC BLOOD PRESSURE: 80 MMHG | HEART RATE: 78 BPM | BODY MASS INDEX: 28.73 KG/M2

## 2024-04-10 DIAGNOSIS — Z79.899 ENCOUNTER FOR LONG-TERM (CURRENT) USE OF MEDICATIONS: Primary | ICD-10-CM

## 2024-04-10 DIAGNOSIS — Q21.12 PFO (PATENT FORAMEN OVALE): ICD-10-CM

## 2024-04-10 DIAGNOSIS — R40.0 DAYTIME SOMNOLENCE: ICD-10-CM

## 2024-04-10 DIAGNOSIS — Z79.899 ENCOUNTER FOR LONG-TERM (CURRENT) USE OF MEDICATIONS: ICD-10-CM

## 2024-04-10 DIAGNOSIS — F90.0 ATTENTION DEFICIT HYPERACTIVITY DISORDER (ADHD), PREDOMINANTLY INATTENTIVE TYPE: Primary | ICD-10-CM

## 2024-04-10 DIAGNOSIS — I63.512 CEREBROVASCULAR ACCIDENT (CVA) DUE TO STENOSIS OF LEFT MIDDLE CEREBRAL ARTERY (HCC): ICD-10-CM

## 2024-04-10 DIAGNOSIS — R41.3 MEMORY IMPAIRMENT: ICD-10-CM

## 2024-04-10 PROCEDURE — 3079F DIAST BP 80-89 MM HG: CPT | Performed by: PSYCHIATRY & NEUROLOGY

## 2024-04-10 PROCEDURE — 4004F PT TOBACCO SCREEN RCVD TLK: CPT | Performed by: PSYCHIATRY & NEUROLOGY

## 2024-04-10 PROCEDURE — 99214 OFFICE O/P EST MOD 30 MIN: CPT | Performed by: PSYCHIATRY & NEUROLOGY

## 2024-04-10 PROCEDURE — 3075F SYST BP GE 130 - 139MM HG: CPT | Performed by: PSYCHIATRY & NEUROLOGY

## 2024-04-10 PROCEDURE — G8427 DOCREV CUR MEDS BY ELIG CLIN: HCPCS | Performed by: PSYCHIATRY & NEUROLOGY

## 2024-04-10 PROCEDURE — 3017F COLORECTAL CA SCREEN DOC REV: CPT | Performed by: PSYCHIATRY & NEUROLOGY

## 2024-04-10 PROCEDURE — G8417 CALC BMI ABV UP PARAM F/U: HCPCS | Performed by: PSYCHIATRY & NEUROLOGY

## 2024-04-10 RX ORDER — DEXTROAMPHETAMINE SACCHARATE, AMPHETAMINE ASPARTATE MONOHYDRATE, DEXTROAMPHETAMINE SULFATE AND AMPHETAMINE SULFATE 3.75; 3.75; 3.75; 3.75 MG/1; MG/1; MG/1; MG/1
15 CAPSULE, EXTENDED RELEASE ORAL DAILY
Qty: 30 CAPSULE | Refills: 0 | Status: SHIPPED | OUTPATIENT
Start: 2024-04-10 | End: 2024-05-10

## 2024-04-10 NOTE — PROGRESS NOTES
CREATININE 1.06 03/06/2020 07:21 AM    GFRAA >60 03/06/2020 07:21 AM    LABGLOM >60 03/06/2020 07:21 AM    GLUCOSE 103 03/06/2020 07:21 AM    PROT 7.0 02/27/2020 03:15 PM    LABALBU 4.1 02/27/2020 03:15 PM    CALCIUM 9.1 03/06/2020 07:21 AM    BILITOT 0.3 02/27/2020 03:15 PM    ALKPHOS 66 02/27/2020 03:15 PM    AST 21 02/27/2020 03:15 PM    ALT 22 02/27/2020 03:15 PM     Lab Results   Component Value Date/Time    PROTIME 11.4 03/05/2020 11:30 AM    INR 1.0 03/05/2020 11:30 AM     No results found for: \"TSH\", \"FZUGDFPS08\", \"FOLATE\", \"FERRITIN\", \"IRON\", \"TIBC\", \"PTRFSAT\", \"RETICCOUNT\", \"FREET4\"  Lab Results   Component Value Date/Time    TRIG 82 07/18/2018 03:48 AM    HDL 58 07/18/2018 03:48 AM    LDLCALC 145 07/18/2018 03:48 AM     No results found for: \"LABAMPH\", \"BARBSCNU\", \"LABBENZ\", \"CANNAB\", \"COCAINESCRN\", \"LABMETH\", \"OPIATESCREENURINE\", \"PHENCYCLIDINESCREENURINE\", \"PPXUR\", \"ETOH\"  No results found for: \"LITHIUM\", \"DILFRTOT\", \"VALPROATE\"    Assessment:       Diagnosis Orders   1. Attention deficit hyperactivity disorder (ADHD), predominantly inattentive type  amphetamine-dextroamphetamine (ADDERALL XR) 15 MG extended release capsule      2. Cerebrovascular accident (CVA) due to stenosis of left middle cerebral artery (HCC)        3. PFO (patent foramen ovale)        4. Memory impairment        5. Daytime somnolence        Attention deficit disorder with a stroke.  Patient had a PFO which was closed and he did well after this.  He then developed sequent hypersomnolence and difficulty with attention and memory secondary to the stroke.  We tried him on a low-dose of Adderall and now he is on 15 mg which is actually adequate.  He ran out of his medication a few days and he could not function.  His stroke risk factors and profiles were reviewed and most of this was related to his PFO and he does not have any significant risk factors.  He is able to function very well with the medication without any side effects of

## 2024-04-12 LAB
6MAM UR QL: NOT DETECTED
7-AMINOCLONAZEPAM: NOT DETECTED
ALPHA-OH-ALPRAZOLAM: NOT DETECTED
ALPHA-OH-MIDAZOLAM, URINE: NOT DETECTED
ALPRAZOLAM: NOT DETECTED
AMPHET UR QL SCN: PRESENT
BARBITURATES: NEGATIVE
BENZOYLECGONINE: NEGATIVE
BUPRENORPHINE: NOT DETECTED
CARISOPRODOL UR QL: NEGATIVE
CLONAZEPAM UR QL: NOT DETECTED
CODEINE: NOT DETECTED
CREAT UR-MCNC: 75.4 MG/DL (ref 20–400)
DIAZEPAM: NOT DETECTED
ETHYL GLUCURONIDE: NEGATIVE
FENTANYL UR QL: NOT DETECTED
GABAPENTIN: NOT DETECTED
HYDROCODONE UR QL: NOT DETECTED
HYDROMORPHONE: NOT DETECTED
LORAZEPAM UR QL: NOT DETECTED
MARIJUANA METABOLITE: NORMAL
MDA: NOT DETECTED
MDEA: NOT DETECTED
MDMA UR QL: NOT DETECTED
MEPERIDINE: NOT DETECTED
METHADONE: NEGATIVE
METHAMPHETAMINE: NOT DETECTED
METHYLPHENIDATE: NOT DETECTED
MIDAZOLAM UR QL SCN: NOT DETECTED
MORPHINE: NOT DETECTED
NALOXONE: NOT DETECTED
NORBUPRENORPHINE, FREE: NOT DETECTED
NORDIAZEPAM: NOT DETECTED
NORFENTANYL: NOT DETECTED
NORHYDROCODONE, URINE: NOT DETECTED
NOROXYCODONE: NOT DETECTED
NOROXYMORPHONE, URINE: NOT DETECTED
OXAZEPAM UR QL: NOT DETECTED
OXYCODONE UR QL: NOT DETECTED
OXYMORPHONE UR QL: NOT DETECTED
PAIN MANAGEMENT DRUG PANEL: NORMAL
PATHOLOGY STUDY: NORMAL
PCP: NEGATIVE
PHENTERMINE: NOT DETECTED
PREGABALIN: NOT DETECTED
TAPENTADOL, URINE: NOT DETECTED
TAPENTADOL-O-SULFATE, URINE: NOT DETECTED
TEMAZEPAM: NOT DETECTED
TRAMADOL: NEGATIVE
ZOLPIDEM: NOT DETECTED

## 2024-05-10 DIAGNOSIS — F90.0 ATTENTION DEFICIT HYPERACTIVITY DISORDER (ADHD), PREDOMINANTLY INATTENTIVE TYPE: ICD-10-CM

## 2024-05-10 RX ORDER — DEXTROAMPHETAMINE SACCHARATE, AMPHETAMINE ASPARTATE MONOHYDRATE, DEXTROAMPHETAMINE SULFATE AND AMPHETAMINE SULFATE 3.75; 3.75; 3.75; 3.75 MG/1; MG/1; MG/1; MG/1
15 CAPSULE, EXTENDED RELEASE ORAL DAILY
Qty: 30 CAPSULE | Refills: 0 | Status: SHIPPED | OUTPATIENT
Start: 2024-05-10 | End: 2024-06-09

## 2024-05-10 NOTE — TELEPHONE ENCOUNTER
Patient is requesting medication refill. Please approve or deny this request.    Rx requested:  Requested Prescriptions     Pending Prescriptions Disp Refills    amphetamine-dextroamphetamine (ADDERALL XR) 15 MG extended release capsule 30 capsule 0     Sig: Take 1 capsule by mouth daily for 30 days.         Last Office Visit:   4/10/2024      Next Visit Date:  Future Appointments   Date Time Provider Department Center   6/13/2024  1:30 PM Kirt Canseco DO [facility]   10/9/2024  2:15 PM Julien Olivo MD [facility] -     Last refill request was 04/10/24

## 2024-06-11 DIAGNOSIS — F90.0 ATTENTION DEFICIT HYPERACTIVITY DISORDER (ADHD), PREDOMINANTLY INATTENTIVE TYPE: ICD-10-CM

## 2024-06-11 RX ORDER — DEXTROAMPHETAMINE SACCHARATE, AMPHETAMINE ASPARTATE MONOHYDRATE, DEXTROAMPHETAMINE SULFATE AND AMPHETAMINE SULFATE 3.75; 3.75; 3.75; 3.75 MG/1; MG/1; MG/1; MG/1
15 CAPSULE, EXTENDED RELEASE ORAL DAILY
Qty: 30 CAPSULE | Refills: 0 | Status: SHIPPED | OUTPATIENT
Start: 2024-06-11 | End: 2024-07-11

## 2024-06-28 ENCOUNTER — OFFICE VISIT (OUTPATIENT)
Dept: CARDIOLOGY CLINIC | Age: 54
End: 2024-06-28
Payer: MEDICAID

## 2024-06-28 VITALS
BODY MASS INDEX: 28.71 KG/M2 | SYSTOLIC BLOOD PRESSURE: 120 MMHG | WEIGHT: 212 LBS | HEIGHT: 72 IN | DIASTOLIC BLOOD PRESSURE: 80 MMHG | HEART RATE: 81 BPM

## 2024-06-28 DIAGNOSIS — I10 ESSENTIAL (PRIMARY) HYPERTENSION: ICD-10-CM

## 2024-06-28 DIAGNOSIS — E78.2 MIXED HYPERLIPIDEMIA: ICD-10-CM

## 2024-06-28 DIAGNOSIS — Q21.12 PFO (PATENT FORAMEN OVALE): ICD-10-CM

## 2024-06-28 DIAGNOSIS — I25.10 CORONARY ARTERY DISEASE INVOLVING NATIVE CORONARY ARTERY OF NATIVE HEART WITHOUT ANGINA PECTORIS: Primary | ICD-10-CM

## 2024-06-28 PROCEDURE — 3074F SYST BP LT 130 MM HG: CPT | Performed by: INTERNAL MEDICINE

## 2024-06-28 PROCEDURE — 3017F COLORECTAL CA SCREEN DOC REV: CPT | Performed by: INTERNAL MEDICINE

## 2024-06-28 PROCEDURE — G8427 DOCREV CUR MEDS BY ELIG CLIN: HCPCS | Performed by: INTERNAL MEDICINE

## 2024-06-28 PROCEDURE — 4004F PT TOBACCO SCREEN RCVD TLK: CPT | Performed by: INTERNAL MEDICINE

## 2024-06-28 PROCEDURE — 99213 OFFICE O/P EST LOW 20 MIN: CPT | Performed by: INTERNAL MEDICINE

## 2024-06-28 PROCEDURE — 3079F DIAST BP 80-89 MM HG: CPT | Performed by: INTERNAL MEDICINE

## 2024-06-28 PROCEDURE — G8417 CALC BMI ABV UP PARAM F/U: HCPCS | Performed by: INTERNAL MEDICINE

## 2024-06-28 PROCEDURE — 93000 ELECTROCARDIOGRAM COMPLETE: CPT | Performed by: INTERNAL MEDICINE

## 2024-06-28 ASSESSMENT — ENCOUNTER SYMPTOMS
GASTROINTESTINAL NEGATIVE: 1
VOMITING: 0
EYES NEGATIVE: 1
SHORTNESS OF BREATH: 0
WHEEZING: 0
ABDOMINAL PAIN: 0
NAUSEA: 0

## 2024-06-28 NOTE — PROGRESS NOTES
2024  CC: CAD     HPI:    Igor Lee (:  1970) has requested an audio/video evaluation for the following concern(s): PFO    S/p PCI of mid LAD. S/p PFO closure with [place] Cardioform.   Pt denies chest pain, dyspnea, dyspnea on exertion, change in exercise capacity, fatigue,  nausea, vomiting, diarrhea, constipation, motor weakness, insomnia, weight loss, syncope, dizziness, lightheadedness, palpitations, PND, orthopnea, or claudication. No nitro use. BP and hr are good. CAD is stable. No LE discoloration or ulcers. No LE edema. No CHF type symptoms. Lipid profile is normal. No recent hospitalization. No change in meds. On DAPT. No bleeding issues. Has had some chest fluttering at times 1-2xday. No neuro type symptoms.     10/22/2020: Patient has been experiencing increased fatigue tiredness for the past couple weeks.  He has been stressed out and working more.  With history of coronary disease status post mid LAD PCI and PFO closure in 2020.  Patient also complains of lightheadedness/dizziness.  Blood pressure is 112/84 heart rate of 58 bpm.  Patient is on aspirin and Plavix.  He is also on a statin.  Status post limited echo in 2020 with no evidence of PFO.  That is post 48-hour Holter monitor with which was essentially negative and normal.  EKG with sinus bradycardia today at heart rate of 50 bpm, no ischemia.  States he is having some memory issues.    21:  Developed LH/dizz at times. With history of coronary disease status post mid LAD PCI and PFO closure in 2020. Pt denies chest pain, dyspnea, dyspnea on exertion, change in exercise capacity, fatigue,  nausea, vomiting, diarrhea, constipation, motor weakness, insomnia, weight loss, syncope, dizziness, lightheadedness, palpitations, PND, orthopnea, or claudication. No nitro use. BP and hr are good. CAD is stable. No LE discoloration or ulcers. No LE edema. No CHF type symptoms. Lipid profile is normal. No recent

## 2024-07-10 DIAGNOSIS — F90.0 ATTENTION DEFICIT HYPERACTIVITY DISORDER (ADHD), PREDOMINANTLY INATTENTIVE TYPE: ICD-10-CM

## 2024-07-10 RX ORDER — DEXTROAMPHETAMINE SACCHARATE, AMPHETAMINE ASPARTATE MONOHYDRATE, DEXTROAMPHETAMINE SULFATE AND AMPHETAMINE SULFATE 3.75; 3.75; 3.75; 3.75 MG/1; MG/1; MG/1; MG/1
15 CAPSULE, EXTENDED RELEASE ORAL DAILY
Qty: 30 CAPSULE | Refills: 0 | Status: SHIPPED | OUTPATIENT
Start: 2024-07-10 | End: 2024-08-09

## 2024-08-16 DIAGNOSIS — F90.0 ATTENTION DEFICIT HYPERACTIVITY DISORDER (ADHD), PREDOMINANTLY INATTENTIVE TYPE: ICD-10-CM

## 2024-08-16 NOTE — TELEPHONE ENCOUNTER
Pt has been out of his med for the last couple of days          Requesting medication refill. Please approve or deny this request.    Rx requested:  Requested Prescriptions     Pending Prescriptions Disp Refills    amphetamine-dextroamphetamine (ADDERALL XR) 15 MG extended release capsule 30 capsule 0     Sig: Take 1 capsule by mouth daily for 30 days.         Last Office Visit:   4/10/2024      Next Visit Date:  Future Appointments   Date Time Provider Department Center   10/9/2024  2:15 PM Julien Olivo MD [facility] -   6/13/2025 11:30 AM Kirt Canseco DO [facility]               Last refill 7/10/24. Please approve or deny.

## 2024-08-17 RX ORDER — DEXTROAMPHETAMINE SACCHARATE, AMPHETAMINE ASPARTATE MONOHYDRATE, DEXTROAMPHETAMINE SULFATE AND AMPHETAMINE SULFATE 3.75; 3.75; 3.75; 3.75 MG/1; MG/1; MG/1; MG/1
15 CAPSULE, EXTENDED RELEASE ORAL DAILY
Qty: 30 CAPSULE | Refills: 0 | Status: SHIPPED | OUTPATIENT
Start: 2024-08-17 | End: 2024-09-16

## 2024-09-16 DIAGNOSIS — F90.0 ATTENTION DEFICIT HYPERACTIVITY DISORDER (ADHD), PREDOMINANTLY INATTENTIVE TYPE: ICD-10-CM

## 2024-09-16 RX ORDER — DEXTROAMPHETAMINE SACCHARATE, AMPHETAMINE ASPARTATE MONOHYDRATE, DEXTROAMPHETAMINE SULFATE AND AMPHETAMINE SULFATE 3.75; 3.75; 3.75; 3.75 MG/1; MG/1; MG/1; MG/1
15 CAPSULE, EXTENDED RELEASE ORAL DAILY
Qty: 30 CAPSULE | Refills: 0 | Status: SHIPPED | OUTPATIENT
Start: 2024-09-16 | End: 2024-10-16

## 2024-10-09 ENCOUNTER — OFFICE VISIT (OUTPATIENT)
Dept: NEUROLOGY | Age: 54
End: 2024-10-09
Payer: MEDICAID

## 2024-10-09 VITALS
DIASTOLIC BLOOD PRESSURE: 60 MMHG | WEIGHT: 206 LBS | BODY MASS INDEX: 27.94 KG/M2 | SYSTOLIC BLOOD PRESSURE: 118 MMHG | HEART RATE: 85 BPM

## 2024-10-09 DIAGNOSIS — R40.0 DAYTIME SOMNOLENCE: ICD-10-CM

## 2024-10-09 DIAGNOSIS — I63.512 CEREBROVASCULAR ACCIDENT (CVA) DUE TO STENOSIS OF LEFT MIDDLE CEREBRAL ARTERY (HCC): Primary | ICD-10-CM

## 2024-10-09 DIAGNOSIS — F90.0 ATTENTION DEFICIT HYPERACTIVITY DISORDER (ADHD), PREDOMINANTLY INATTENTIVE TYPE: ICD-10-CM

## 2024-10-09 DIAGNOSIS — Q21.12 PFO (PATENT FORAMEN OVALE): ICD-10-CM

## 2024-10-09 PROCEDURE — G8417 CALC BMI ABV UP PARAM F/U: HCPCS | Performed by: PSYCHIATRY & NEUROLOGY

## 2024-10-09 PROCEDURE — 99214 OFFICE O/P EST MOD 30 MIN: CPT | Performed by: PSYCHIATRY & NEUROLOGY

## 2024-10-09 PROCEDURE — G8484 FLU IMMUNIZE NO ADMIN: HCPCS | Performed by: PSYCHIATRY & NEUROLOGY

## 2024-10-09 PROCEDURE — 4004F PT TOBACCO SCREEN RCVD TLK: CPT | Performed by: PSYCHIATRY & NEUROLOGY

## 2024-10-09 PROCEDURE — 3017F COLORECTAL CA SCREEN DOC REV: CPT | Performed by: PSYCHIATRY & NEUROLOGY

## 2024-10-09 PROCEDURE — 3074F SYST BP LT 130 MM HG: CPT | Performed by: PSYCHIATRY & NEUROLOGY

## 2024-10-09 PROCEDURE — 3078F DIAST BP <80 MM HG: CPT | Performed by: PSYCHIATRY & NEUROLOGY

## 2024-10-09 PROCEDURE — G8427 DOCREV CUR MEDS BY ELIG CLIN: HCPCS | Performed by: PSYCHIATRY & NEUROLOGY

## 2024-10-09 NOTE — PROGRESS NOTES
Subjective:      Patient ID: Igor Lee is a 53 y.o. male who presents today for:  Chief Complaint   Patient presents with    6 Month Follow-Up     Pt states things are good. No issues. No questions or concerns at this time        HPI 53 right-handed gentleman now with a history of ADD.  Patient also has some stenosis of the left middle cerebral artery with a PFO with memory impairment.  Patient continues still have some daytime somnolence and has some response to the Adderall that we gave him.  He is on 15 mg during the day.  Patient is tolerating this quite well without any side effect.  Patient is followed by cardiology for his PFO.  Patient's PFO was closed and is done very well after this.  This was discovered when he had a small stroke.  The stroke appeared to be left frontal.  Patient is not requiring further intervention has not developed atrial fibrillation.    Past Medical History:   Diagnosis Date    Anxiety     Coronary artery disease involving native coronary artery of native heart without angina pectoris 10/22/2020    CVA (cerebral vascular accident) (HCC)     Hyperlipidemia     WOOD (obstructive sleep apnea)     PFO (patent foramen ovale)      Past Surgical History:   Procedure Laterality Date    DIAGNOSTIC CARDIAC CATH LAB PROCEDURE  02/27/2020    PERIPHERAL PERCUTANEOUS ARTERIAL INTERVENTION  02/27/2020     Social History     Socioeconomic History    Marital status:      Spouse name: Not on file    Number of children: Not on file    Years of education: Not on file    Highest education level: Not on file   Occupational History    Not on file   Tobacco Use    Smoking status: Every Day     Types: Cigars    Smokeless tobacco: Never   Substance and Sexual Activity    Alcohol use: Not Currently    Drug use: Not Currently    Sexual activity: Yes   Other Topics Concern    Not on file   Social History Narrative    Not on file     Social Determinants of Health     Financial Resource Strain: Not

## 2024-10-18 DIAGNOSIS — F90.0 ATTENTION DEFICIT HYPERACTIVITY DISORDER (ADHD), PREDOMINANTLY INATTENTIVE TYPE: ICD-10-CM

## 2024-10-18 RX ORDER — DEXTROAMPHETAMINE SACCHARATE, AMPHETAMINE ASPARTATE MONOHYDRATE, DEXTROAMPHETAMINE SULFATE AND AMPHETAMINE SULFATE 3.75; 3.75; 3.75; 3.75 MG/1; MG/1; MG/1; MG/1
15 CAPSULE, EXTENDED RELEASE ORAL DAILY
Qty: 30 CAPSULE | Refills: 0 | Status: SHIPPED | OUTPATIENT
Start: 2024-10-18 | End: 2024-11-17

## 2024-10-18 NOTE — TELEPHONE ENCOUNTER
Requesting medication refill. Please approve or deny this request.    Rx requested:  Requested Prescriptions     Pending Prescriptions Disp Refills    amphetamine-dextroamphetamine (ADDERALL XR) 15 MG extended release capsule 30 capsule 0     Sig: Take 1 capsule by mouth daily for 30 days.         Last Office Visit:   10/9/2024      Next Visit Date:  Future Appointments   Date Time Provider Department Center   4/21/2025  1:15 PM Julien Olivo MD [facility] -   6/13/2025 11:30 AM Kirt Canseco DO [facility]               Last refill 9/16/24. Please approve or deny.

## 2024-11-15 DIAGNOSIS — F90.0 ATTENTION DEFICIT HYPERACTIVITY DISORDER (ADHD), PREDOMINANTLY INATTENTIVE TYPE: ICD-10-CM

## 2024-11-15 RX ORDER — DEXTROAMPHETAMINE SACCHARATE, AMPHETAMINE ASPARTATE MONOHYDRATE, DEXTROAMPHETAMINE SULFATE AND AMPHETAMINE SULFATE 3.75; 3.75; 3.75; 3.75 MG/1; MG/1; MG/1; MG/1
15 CAPSULE, EXTENDED RELEASE ORAL DAILY
Qty: 30 CAPSULE | Refills: 0 | Status: SHIPPED | OUTPATIENT
Start: 2024-11-15 | End: 2024-12-15

## 2024-11-15 NOTE — TELEPHONE ENCOUNTER
Requesting medication refill. Please approve or deny this request.    Rx requested:  Requested Prescriptions     Pending Prescriptions Disp Refills    amphetamine-dextroamphetamine (ADDERALL XR) 15 MG extended release capsule 30 capsule 0     Sig: Take 1 capsule by mouth daily for 30 days.         Last Office Visit:   10/9/2024      Next Visit Date:  Future Appointments   Date Time Provider Department Center   4/21/2025  1:15 PM Julien Olivo MD [facility] -   6/13/2025 11:30 AM Kirt Canseco [facility]               Last refill 10/18/24. Please approve or deny.

## 2024-12-17 DIAGNOSIS — F90.0 ATTENTION DEFICIT HYPERACTIVITY DISORDER (ADHD), PREDOMINANTLY INATTENTIVE TYPE: ICD-10-CM

## 2024-12-17 NOTE — TELEPHONE ENCOUNTER
Requesting medication refill. Please approve or deny this request.    Rx requested:  Requested Prescriptions     Pending Prescriptions Disp Refills    amphetamine-dextroamphetamine (ADDERALL XR) 15 MG extended release capsule 30 capsule 0     Sig: Take 1 capsule by mouth daily for 30 days.         Last Office Visit:   10/9/2024      Next Visit Date:  Future Appointments   Date Time Provider Department Center   4/21/2025  1:15 PM Julien Olivo MD [facility] -   6/13/2025 11:30 AM Kirt Canseco [facility]               Last refill 11/15/24. Please approve or deny.

## 2024-12-18 RX ORDER — DEXTROAMPHETAMINE SACCHARATE, AMPHETAMINE ASPARTATE MONOHYDRATE, DEXTROAMPHETAMINE SULFATE AND AMPHETAMINE SULFATE 3.75; 3.75; 3.75; 3.75 MG/1; MG/1; MG/1; MG/1
15 CAPSULE, EXTENDED RELEASE ORAL DAILY
Qty: 30 CAPSULE | Refills: 0 | Status: SHIPPED | OUTPATIENT
Start: 2024-12-18 | End: 2025-01-17

## 2025-01-20 DIAGNOSIS — F90.0 ATTENTION DEFICIT HYPERACTIVITY DISORDER (ADHD), PREDOMINANTLY INATTENTIVE TYPE: ICD-10-CM

## 2025-01-20 NOTE — TELEPHONE ENCOUNTER
Requesting medication refill. Please approve or deny this request.    Rx requested:  Requested Prescriptions     Pending Prescriptions Disp Refills    amphetamine-dextroamphetamine (ADDERALL XR) 15 MG extended release capsule 30 capsule 0     Sig: Take 1 capsule by mouth daily for 30 days.         Last Office Visit:   10/9/2024      Next Visit Date:  Future Appointments   Date Time Provider Department Center   4/21/2025  1:15 PM Julien Olivo MD [facility] -   6/13/2025 11:30 AM Kirt Canseco DO [facility]               Last refill 12/18/244. Please approve or deny.

## 2025-01-22 RX ORDER — DEXTROAMPHETAMINE SACCHARATE, AMPHETAMINE ASPARTATE MONOHYDRATE, DEXTROAMPHETAMINE SULFATE AND AMPHETAMINE SULFATE 3.75; 3.75; 3.75; 3.75 MG/1; MG/1; MG/1; MG/1
15 CAPSULE, EXTENDED RELEASE ORAL DAILY
Qty: 30 CAPSULE | Refills: 0 | Status: SHIPPED | OUTPATIENT
Start: 2025-01-22 | End: 2025-02-21

## 2025-02-19 DIAGNOSIS — F90.0 ATTENTION DEFICIT HYPERACTIVITY DISORDER (ADHD), PREDOMINANTLY INATTENTIVE TYPE: ICD-10-CM

## 2025-02-19 RX ORDER — DEXTROAMPHETAMINE SACCHARATE, AMPHETAMINE ASPARTATE MONOHYDRATE, DEXTROAMPHETAMINE SULFATE AND AMPHETAMINE SULFATE 3.75; 3.75; 3.75; 3.75 MG/1; MG/1; MG/1; MG/1
15 CAPSULE, EXTENDED RELEASE ORAL DAILY
Qty: 30 CAPSULE | Refills: 0 | Status: SHIPPED | OUTPATIENT
Start: 2025-02-19 | End: 2025-03-21

## 2025-02-19 NOTE — TELEPHONE ENCOUNTER
Requesting medication refill. Please approve or deny this request.    Rx requested:  Requested Prescriptions     Pending Prescriptions Disp Refills    amphetamine-dextroamphetamine (ADDERALL XR) 15 MG extended release capsule 30 capsule 0     Sig: Take 1 capsule by mouth daily for 30 days.         Last Office Visit:   10/9/2024      Next Visit Date:  Future Appointments   Date Time Provider Department Center   4/21/2025  1:15 PM Julien Olivo MD [facility] -   6/13/2025 11:30 AM Kirt Canseco [facility]               Last refill 1/22/25. Please approve or deny.

## 2025-03-21 DIAGNOSIS — F90.0 ATTENTION DEFICIT HYPERACTIVITY DISORDER (ADHD), PREDOMINANTLY INATTENTIVE TYPE: ICD-10-CM

## 2025-03-21 RX ORDER — DEXTROAMPHETAMINE SACCHARATE, AMPHETAMINE ASPARTATE MONOHYDRATE, DEXTROAMPHETAMINE SULFATE AND AMPHETAMINE SULFATE 3.75; 3.75; 3.75; 3.75 MG/1; MG/1; MG/1; MG/1
15 CAPSULE, EXTENDED RELEASE ORAL DAILY
Qty: 30 CAPSULE | Refills: 0 | Status: SHIPPED | OUTPATIENT
Start: 2025-03-21 | End: 2025-04-20

## 2025-03-21 NOTE — TELEPHONE ENCOUNTER
Requesting medication refill. Please approve or deny this request.    Rx requested:  Requested Prescriptions     Pending Prescriptions Disp Refills    amphetamine-dextroamphetamine (ADDERALL XR) 15 MG extended release capsule 30 capsule 0     Sig: Take 1 capsule by mouth daily for 30 days.         Last Office Visit:   10/9/2024      Next Visit Date:  Future Appointments   Date Time Provider Department Center   4/21/2025  1:15 PM Julien Olivo MD [facility] -   6/13/2025 11:30 AM Kirt Canseco [facility]               Last refill 2/19/25. Please approve or deny.

## 2025-04-21 ENCOUNTER — OFFICE VISIT (OUTPATIENT)
Age: 55
End: 2025-04-21
Payer: MEDICAID

## 2025-04-21 VITALS
SYSTOLIC BLOOD PRESSURE: 120 MMHG | HEART RATE: 89 BPM | WEIGHT: 213 LBS | BODY MASS INDEX: 28.89 KG/M2 | DIASTOLIC BLOOD PRESSURE: 70 MMHG

## 2025-04-21 DIAGNOSIS — Q21.12 PFO (PATENT FORAMEN OVALE): ICD-10-CM

## 2025-04-21 DIAGNOSIS — Z86.73 HISTORY OF STROKE: ICD-10-CM

## 2025-04-21 DIAGNOSIS — F90.0 ATTENTION DEFICIT HYPERACTIVITY DISORDER (ADHD), PREDOMINANTLY INATTENTIVE TYPE: ICD-10-CM

## 2025-04-21 DIAGNOSIS — Z79.899 ENCOUNTER FOR LONG-TERM (CURRENT) USE OF MEDICATIONS: ICD-10-CM

## 2025-04-21 DIAGNOSIS — Z79.899 ENCOUNTER FOR LONG-TERM (CURRENT) USE OF MEDICATIONS: Primary | ICD-10-CM

## 2025-04-21 DIAGNOSIS — I69.351 HEMIPLEGIA OF RIGHT DOMINANT SIDE AS LATE EFFECT OF CEREBRAL INFARCTION, UNSPECIFIED HEMIPLEGIA TYPE (HCC): ICD-10-CM

## 2025-04-21 PROBLEM — I63.512 CEREBROVASCULAR ACCIDENT (CVA) DUE TO STENOSIS OF LEFT MIDDLE CEREBRAL ARTERY (HCC): Status: RESOLVED | Noted: 2018-07-17 | Resolved: 2025-04-21

## 2025-04-21 PROCEDURE — 99213 OFFICE O/P EST LOW 20 MIN: CPT | Performed by: PSYCHIATRY & NEUROLOGY

## 2025-04-21 RX ORDER — DEXTROAMPHETAMINE SACCHARATE, AMPHETAMINE ASPARTATE MONOHYDRATE, DEXTROAMPHETAMINE SULFATE AND AMPHETAMINE SULFATE 3.75; 3.75; 3.75; 3.75 MG/1; MG/1; MG/1; MG/1
15 CAPSULE, EXTENDED RELEASE ORAL DAILY
Qty: 30 CAPSULE | Refills: 0 | Status: SHIPPED | OUTPATIENT
Start: 2025-04-21 | End: 2025-05-21

## 2025-04-21 ASSESSMENT — ENCOUNTER SYMPTOMS
VOMITING: 0
COLOR CHANGE: 0
NAUSEA: 0
CHOKING: 0
PHOTOPHOBIA: 0
SHORTNESS OF BREATH: 0
TROUBLE SWALLOWING: 0
BACK PAIN: 0

## 2025-04-21 NOTE — PROGRESS NOTES
Subjective:      Patient ID: Igor Lee is a 54 y.o. male who presents today for:  Chief Complaint   Patient presents with    6 Month Follow-Up     Pt states things are good. Pt states things are the same. No questions or concern at this time        HPI 54-year-old right-handed gentleman with history of stroke.  Patient also is seen here for daytime somnolence and ADHD.  Patient has a history of PFO.  Patient continues on Adderall XR 15 mg.  Patient had a left middle cerebral artery stroke.  Patient's PFO was closed and does not have any residuals.  Patient has mild residuals of weakness and hemiparesis.  No seizures are noted.  Patient does not have atrial fibrillation.  Past Medical History:   Diagnosis Date    Anxiety     Coronary artery disease involving native coronary artery of native heart without angina pectoris 10/22/2020    CVA (cerebral vascular accident) (HCC)     Hyperlipidemia     WOOD (obstructive sleep apnea)     PFO (patent foramen ovale)      Past Surgical History:   Procedure Laterality Date    DIAGNOSTIC CARDIAC CATH LAB PROCEDURE  02/27/2020    PERIPHERAL PERCUTANEOUS ARTERIAL INTERVENTION  02/27/2020     Social History     Socioeconomic History    Marital status:      Spouse name: Not on file    Number of children: Not on file    Years of education: Not on file    Highest education level: Not on file   Occupational History    Not on file   Tobacco Use    Smoking status: Every Day     Types: Cigars    Smokeless tobacco: Never   Substance and Sexual Activity    Alcohol use: Not Currently    Drug use: Not Currently    Sexual activity: Yes   Other Topics Concern    Not on file   Social History Narrative    Not on file     Social Drivers of Health     Financial Resource Strain: Not on file   Food Insecurity: Not on file   Transportation Needs: Not on file   Physical Activity: Not on file   Stress: Not on file   Social Connections: Not on file   Intimate Partner Violence: Not on file

## 2025-04-24 LAB
6MAM UR QL: NOT DETECTED
7-AMINOCLONAZEPAM: NOT DETECTED
ALPHA-OH-ALPRAZOLAM: NOT DETECTED
ALPHA-OH-MIDAZOLAM, URINE: NOT DETECTED
ALPRAZOLAM: NOT DETECTED
AMPHET UR QL SCN: PRESENT
BARBITURATES: NEGATIVE
BENZOYLECGONINE: NEGATIVE
BUPRENORPHINE: NOT DETECTED
CARISOPRODOL UR QL: NEGATIVE
CLONAZEPAM UR QL: NOT DETECTED
CODEINE: NOT DETECTED
CREAT UR-MCNC: 95.6 MG/DL (ref 20–400)
DIAZEPAM: NOT DETECTED
ETHYL GLUCURONIDE: NEGATIVE
FENTANYL UR QL: NOT DETECTED
GABAPENTIN: NOT DETECTED
HYDROCODONE UR QL: NOT DETECTED
HYDROMORPHONE: NOT DETECTED
LORAZEPAM UR QL: NOT DETECTED
MARIJUANA METABOLITE: NORMAL
MDA: NOT DETECTED
MDEA: NOT DETECTED
MDMA UR QL: NOT DETECTED
MEPERIDINE: NOT DETECTED
METHADONE: NEGATIVE
METHAMPHETAMINE: NOT DETECTED
METHYLPHENIDATE: NOT DETECTED
MIDAZOLAM UR QL SCN: NOT DETECTED
MORPHINE: NOT DETECTED
NALOXONE: NOT DETECTED
NORBUPRENORPHINE, FREE: NOT DETECTED
NORDIAZEPAM: NOT DETECTED
NORFENTANYL: NOT DETECTED
NORHYDROCODONE, URINE: NOT DETECTED
NOROXYCODONE: NOT DETECTED
NOROXYMORPHONE, URINE: NOT DETECTED
OXAZEPAM UR QL: NOT DETECTED
OXYCODONE UR QL: NOT DETECTED
OXYMORPHONE UR QL: NOT DETECTED
PAIN MANAGEMENT DRUG PANEL: NORMAL
PATHOLOGY STUDY: NORMAL
PCP: NEGATIVE
PHENTERMINE: NOT DETECTED
PREGABALIN: NOT DETECTED
TAPENTADOL, URINE: NOT DETECTED
TAPENTADOL-O-SULFATE, URINE: NOT DETECTED
TEMAZEPAM: NOT DETECTED
TRAMADOL: NEGATIVE
ZOLPIDEM: NOT DETECTED

## 2025-05-19 DIAGNOSIS — F90.0 ATTENTION DEFICIT HYPERACTIVITY DISORDER (ADHD), PREDOMINANTLY INATTENTIVE TYPE: ICD-10-CM

## 2025-05-19 RX ORDER — DEXTROAMPHETAMINE SACCHARATE, AMPHETAMINE ASPARTATE MONOHYDRATE, DEXTROAMPHETAMINE SULFATE AND AMPHETAMINE SULFATE 3.75; 3.75; 3.75; 3.75 MG/1; MG/1; MG/1; MG/1
15 CAPSULE, EXTENDED RELEASE ORAL DAILY
Qty: 30 CAPSULE | Refills: 0 | Status: SHIPPED | OUTPATIENT
Start: 2025-05-19 | End: 2025-06-18

## 2025-05-19 NOTE — TELEPHONE ENCOUNTER
Requesting medication refill. Please approve or deny this request.    Rx requested:  Requested Prescriptions     Pending Prescriptions Disp Refills    amphetamine-dextroamphetamine (ADDERALL XR) 15 MG extended release capsule 30 capsule 0     Sig: Take 1 capsule by mouth daily for 30 days.         Last Office Visit:   4/21/2025      Next Visit Date:  Future Appointments   Date Time Provider Department Center   6/13/2025 11:30 AM Holiday, DO [facility]   10/20/2025  1:15 PM Julien Olivo MD [facility] -               Last refill 4/21/25. Please approve or deny.

## 2025-06-20 DIAGNOSIS — F90.0 ATTENTION DEFICIT HYPERACTIVITY DISORDER (ADHD), PREDOMINANTLY INATTENTIVE TYPE: ICD-10-CM

## 2025-06-21 RX ORDER — DEXTROAMPHETAMINE SACCHARATE, AMPHETAMINE ASPARTATE MONOHYDRATE, DEXTROAMPHETAMINE SULFATE AND AMPHETAMINE SULFATE 3.75; 3.75; 3.75; 3.75 MG/1; MG/1; MG/1; MG/1
15 CAPSULE, EXTENDED RELEASE ORAL DAILY
Qty: 30 CAPSULE | Refills: 0 | Status: SHIPPED | OUTPATIENT
Start: 2025-06-21 | End: 2025-07-21

## 2025-07-11 SDOH — HEALTH STABILITY: PHYSICAL HEALTH: ON AVERAGE, HOW MANY DAYS PER WEEK DO YOU ENGAGE IN MODERATE TO STRENUOUS EXERCISE (LIKE A BRISK WALK)?: 7 DAYS

## 2025-07-11 SDOH — HEALTH STABILITY: PHYSICAL HEALTH: ON AVERAGE, HOW MANY MINUTES DO YOU ENGAGE IN EXERCISE AT THIS LEVEL?: 60 MIN

## 2025-07-13 NOTE — PROGRESS NOTES
Well Adult Note  Name: Igor Lee Today’s Date: 2025   MRN: [id number] Sex: Male   Age: 54 y.o. Ethnicity: Non- / Non    : 1970 Race: White (non-)      Igor Lee is here for a well adult exam.       Subjective   History:  The patient presents for establishment of care.    He has been without a primary care provider for approximately 1.5 years. He reports a history of hypertension and coronary artery disease, currently managed with aspirin. He is not on statin medication. In 2019, he underwent a procedure to close a hole in his heart performed by Dr. Maravilla. Approximately 6 years ago, he experienced a stroke, which led to the discovery of the heart defect. He follows up with Dr. Olivo for stroke management. He reports no lasting physical effects from the stroke but mentions experiencing mental effects, including minimal brain damage and feeling overwhelmed easily. He uses Adderall to help with focus, as recommended by his neurologist, Dr. Olivo.    HTN/CAD  - carries diagnosis of CAD and HTN.   - Only on ASA and previously on statin medication, but no longer taking. Was on atorvastatin 40 mg daily.   - previously had PCI of mid LAD and PFO closure. Had it fixed it.     Previously CVA  - occurred 6 years ago.   - No late effects in term of physical. Patient having trouble with emotional liability and decreased in attention space. Managed with adderral XR 15 mg.     Left Shoulder  He has been experiencing issues with his left shoulder for several years, which he attributes to an injury sustained at work in his early 20s. The shoulder used to dislocate easily but has not done so recently. However, it becomes painful with overuse, certain movements, or when lying on it. The pain is described as sharp, akin to a tearing sensation, followed by stiffness and weakness. If he rests the shoulder, the pain subsides within a day, but if he continues to use it, the pain can

## 2025-07-14 ENCOUNTER — OFFICE VISIT (OUTPATIENT)
Age: 55
End: 2025-07-14
Payer: MEDICAID

## 2025-07-14 ENCOUNTER — HOSPITAL ENCOUNTER (OUTPATIENT)
Dept: GENERAL RADIOLOGY | Age: 55
Discharge: HOME | End: 2025-07-16
Payer: MEDICAID

## 2025-07-14 VITALS
OXYGEN SATURATION: 99 % | TEMPERATURE: 98.2 F | WEIGHT: 200 LBS | DIASTOLIC BLOOD PRESSURE: 80 MMHG | HEART RATE: 69 BPM | BODY MASS INDEX: 27.09 KG/M2 | SYSTOLIC BLOOD PRESSURE: 122 MMHG | HEIGHT: 72 IN

## 2025-07-14 DIAGNOSIS — Z11.59 NEED FOR HEPATITIS C SCREENING TEST: ICD-10-CM

## 2025-07-14 DIAGNOSIS — Z76.89 ENCOUNTER TO ESTABLISH CARE: ICD-10-CM

## 2025-07-14 DIAGNOSIS — Z00.00 ENCOUNTER FOR WELL ADULT EXAM WITHOUT ABNORMAL FINDINGS: Primary | ICD-10-CM

## 2025-07-14 DIAGNOSIS — E78.2 MIXED HYPERLIPIDEMIA: ICD-10-CM

## 2025-07-14 DIAGNOSIS — Z12.11 ENCOUNTER FOR SCREENING COLONOSCOPY: ICD-10-CM

## 2025-07-14 DIAGNOSIS — I10 ESSENTIAL (PRIMARY) HYPERTENSION: ICD-10-CM

## 2025-07-14 DIAGNOSIS — Z12.5 SCREENING PSA (PROSTATE SPECIFIC ANTIGEN): ICD-10-CM

## 2025-07-14 DIAGNOSIS — M12.812 ROTATOR CUFF ARTHROPATHY OF LEFT SHOULDER: ICD-10-CM

## 2025-07-14 DIAGNOSIS — J45.20 MILD INTERMITTENT ASTHMA, UNSPECIFIED WHETHER COMPLICATED: ICD-10-CM

## 2025-07-14 DIAGNOSIS — Z11.4 SCREENING FOR HIV WITHOUT PRESENCE OF RISK FACTORS: ICD-10-CM

## 2025-07-14 DIAGNOSIS — J30.1 SEASONAL ALLERGIC RHINITIS DUE TO POLLEN: ICD-10-CM

## 2025-07-14 LAB
ALBUMIN SERPL-MCNC: 4.6 G/DL (ref 3.5–4.6)
ALP SERPL-CCNC: 63 U/L (ref 35–104)
ALT SERPL-CCNC: 15 U/L (ref 0–41)
ANION GAP SERPL CALCULATED.3IONS-SCNC: 12 MEQ/L (ref 9–15)
AST SERPL-CCNC: 19 U/L (ref 0–40)
BASOPHILS # BLD: 0 K/UL (ref 0–0.2)
BASOPHILS NFR BLD: 0.6 %
BILIRUB SERPL-MCNC: 0.5 MG/DL (ref 0.2–0.7)
BUN SERPL-MCNC: 16 MG/DL (ref 6–20)
CALCIUM SERPL-MCNC: 9.6 MG/DL (ref 8.5–9.9)
CHLORIDE SERPL-SCNC: 101 MEQ/L (ref 95–107)
CHOLEST SERPL-MCNC: 240 MG/DL (ref 0–199)
CO2 SERPL-SCNC: 25 MEQ/L (ref 20–31)
CREAT SERPL-MCNC: 1.01 MG/DL (ref 0.7–1.2)
EOSINOPHIL # BLD: 0.1 K/UL (ref 0–0.7)
EOSINOPHIL NFR BLD: 1.7 %
ERYTHROCYTE [DISTWIDTH] IN BLOOD BY AUTOMATED COUNT: 12.5 % (ref 11.5–14.5)
GLOBULIN SER CALC-MCNC: 2.6 G/DL (ref 2.3–3.5)
GLUCOSE SERPL-MCNC: 85 MG/DL (ref 70–99)
HCT VFR BLD AUTO: 42.7 % (ref 42–52)
HDLC SERPL-MCNC: 78 MG/DL (ref 40–59)
HGB BLD-MCNC: 14.9 G/DL (ref 14–18)
LDLC SERPL CALC-MCNC: 154 MG/DL (ref 0–129)
LYMPHOCYTES # BLD: 1.5 K/UL (ref 1–4.8)
LYMPHOCYTES NFR BLD: 22.8 %
MCH RBC QN AUTO: 29.6 PG (ref 27–31.3)
MCHC RBC AUTO-ENTMCNC: 34.9 % (ref 33–37)
MCV RBC AUTO: 84.9 FL (ref 79–92.2)
MONOCYTES # BLD: 0.4 K/UL (ref 0.2–0.8)
MONOCYTES NFR BLD: 6.9 %
NEUTROPHILS # BLD: 4.3 K/UL (ref 1.4–6.5)
NEUTS SEG NFR BLD: 67.7 %
PLATELET # BLD AUTO: 258 K/UL (ref 130–400)
POTASSIUM SERPL-SCNC: 3.9 MEQ/L (ref 3.4–4.9)
PROT SERPL-MCNC: 7.2 G/DL (ref 6.3–8)
PSA SERPL-MCNC: 1.76 NG/ML (ref 0–4)
RBC # BLD AUTO: 5.03 M/UL (ref 4.7–6.1)
SODIUM SERPL-SCNC: 138 MEQ/L (ref 135–144)
TRIGL SERPL-MCNC: 41 MG/DL (ref 0–150)
WBC # BLD AUTO: 6.4 K/UL (ref 4.8–10.8)

## 2025-07-14 PROCEDURE — 99386 PREV VISIT NEW AGE 40-64: CPT | Performed by: PHYSICIAN ASSISTANT

## 2025-07-14 PROCEDURE — 3074F SYST BP LT 130 MM HG: CPT | Performed by: PHYSICIAN ASSISTANT

## 2025-07-14 PROCEDURE — 73030 X-RAY EXAM OF SHOULDER: CPT

## 2025-07-14 PROCEDURE — 3079F DIAST BP 80-89 MM HG: CPT | Performed by: PHYSICIAN ASSISTANT

## 2025-07-14 RX ORDER — ALBUTEROL SULFATE 90 UG/1
2 INHALANT RESPIRATORY (INHALATION) EVERY 4 HOURS PRN
Qty: 18 G | Refills: 5 | Status: SHIPPED | OUTPATIENT
Start: 2025-07-14

## 2025-07-14 RX ORDER — FLUTICASONE PROPIONATE 50 MCG
2 SPRAY, SUSPENSION (ML) NASAL DAILY
Qty: 16 G | Refills: 0 | Status: SHIPPED | OUTPATIENT
Start: 2025-07-14 | End: 2025-07-28

## 2025-07-14 SDOH — ECONOMIC STABILITY: FOOD INSECURITY: WITHIN THE PAST 12 MONTHS, YOU WORRIED THAT YOUR FOOD WOULD RUN OUT BEFORE YOU GOT MONEY TO BUY MORE.: NEVER TRUE

## 2025-07-14 SDOH — ECONOMIC STABILITY: FOOD INSECURITY: WITHIN THE PAST 12 MONTHS, THE FOOD YOU BOUGHT JUST DIDN'T LAST AND YOU DIDN'T HAVE MONEY TO GET MORE.: NEVER TRUE

## 2025-07-14 ASSESSMENT — PATIENT HEALTH QUESTIONNAIRE - PHQ9
4. FEELING TIRED OR HAVING LITTLE ENERGY: NOT AT ALL
3. TROUBLE FALLING OR STAYING ASLEEP: NOT AT ALL
6. FEELING BAD ABOUT YOURSELF - OR THAT YOU ARE A FAILURE OR HAVE LET YOURSELF OR YOUR FAMILY DOWN: NOT AT ALL
8. MOVING OR SPEAKING SO SLOWLY THAT OTHER PEOPLE COULD HAVE NOTICED. OR THE OPPOSITE, BEING SO FIGETY OR RESTLESS THAT YOU HAVE BEEN MOVING AROUND A LOT MORE THAN USUAL: NOT AT ALL
SUM OF ALL RESPONSES TO PHQ QUESTIONS 1-9: 0
10. IF YOU CHECKED OFF ANY PROBLEMS, HOW DIFFICULT HAVE THESE PROBLEMS MADE IT FOR YOU TO DO YOUR WORK, TAKE CARE OF THINGS AT HOME, OR GET ALONG WITH OTHER PEOPLE: NOT DIFFICULT AT ALL
7. TROUBLE CONCENTRATING ON THINGS, SUCH AS READING THE NEWSPAPER OR WATCHING TELEVISION: NOT AT ALL
1. LITTLE INTEREST OR PLEASURE IN DOING THINGS: NOT AT ALL
SUM OF ALL RESPONSES TO PHQ QUESTIONS 1-9: 0
5. POOR APPETITE OR OVEREATING: NOT AT ALL
9. THOUGHTS THAT YOU WOULD BE BETTER OFF DEAD, OR OF HURTING YOURSELF: NOT AT ALL
SUM OF ALL RESPONSES TO PHQ QUESTIONS 1-9: 0
SUM OF ALL RESPONSES TO PHQ QUESTIONS 1-9: 0
2. FEELING DOWN, DEPRESSED OR HOPELESS: NOT AT ALL

## 2025-07-14 ASSESSMENT — ENCOUNTER SYMPTOMS
SORE THROAT: 0
BACK PAIN: 0
COUGH: 0
ABDOMINAL PAIN: 0
VOMITING: 0
NAUSEA: 0
DIARRHEA: 0
CHEST TIGHTNESS: 0
SINUS PAIN: 0
SHORTNESS OF BREATH: 0
SINUS PRESSURE: 0

## 2025-07-14 ASSESSMENT — VISUAL ACUITY: OU: 1

## 2025-07-15 ENCOUNTER — RESULTS FOLLOW-UP (OUTPATIENT)
Age: 55
End: 2025-07-15

## 2025-07-15 DIAGNOSIS — E78.2 MIXED HYPERLIPIDEMIA: Primary | ICD-10-CM

## 2025-07-15 RX ORDER — ROSUVASTATIN CALCIUM 10 MG/1
10 TABLET, COATED ORAL NIGHTLY
Qty: 30 TABLET | Refills: 3 | Status: SHIPPED | OUTPATIENT
Start: 2025-07-15

## 2025-07-24 DIAGNOSIS — F90.0 ATTENTION DEFICIT HYPERACTIVITY DISORDER (ADHD), PREDOMINANTLY INATTENTIVE TYPE: ICD-10-CM

## 2025-07-24 RX ORDER — DEXTROAMPHETAMINE SACCHARATE, AMPHETAMINE ASPARTATE MONOHYDRATE, DEXTROAMPHETAMINE SULFATE AND AMPHETAMINE SULFATE 3.75; 3.75; 3.75; 3.75 MG/1; MG/1; MG/1; MG/1
15 CAPSULE, EXTENDED RELEASE ORAL DAILY
Qty: 30 CAPSULE | Refills: 0 | Status: SHIPPED | OUTPATIENT
Start: 2025-07-24 | End: 2025-08-19 | Stop reason: SDUPTHER

## 2025-08-04 ENCOUNTER — HOSPITAL ENCOUNTER (OUTPATIENT)
Dept: PHYSICAL THERAPY | Age: 55
Setting detail: THERAPIES SERIES
Discharge: HOME | End: 2025-08-04
Payer: MEDICAID

## 2025-08-04 PROCEDURE — 97161 PT EVAL LOW COMPLEX 20 MIN: CPT

## 2025-08-05 RX ORDER — POLYETHYLENE GLYCOL 3350, SODIUM CHLORIDE, SODIUM BICARBONATE, POTASSIUM CHLORIDE 420; 11.2; 5.72; 1.48 G/4L; G/4L; G/4L; G/4L
4000 POWDER, FOR SOLUTION ORAL ONCE
Qty: 4000 ML | Refills: 0 | Status: SHIPPED | OUTPATIENT
Start: 2025-08-05 | End: 2025-08-05

## 2025-08-11 ENCOUNTER — HOSPITAL ENCOUNTER (OUTPATIENT)
Dept: PHYSICAL THERAPY | Age: 55
Setting detail: THERAPIES SERIES
Discharge: HOME | End: 2025-08-11
Payer: MEDICAID

## 2025-08-18 ENCOUNTER — HOSPITAL ENCOUNTER (OUTPATIENT)
Dept: PHYSICAL THERAPY | Age: 55
Setting detail: THERAPIES SERIES
Discharge: HOME | End: 2025-08-18
Payer: MEDICAID

## 2025-08-18 PROCEDURE — 97110 THERAPEUTIC EXERCISES: CPT

## 2025-08-18 ASSESSMENT — PAIN SCALES - GENERAL: PAINLEVEL_OUTOF10: 0

## 2025-08-19 DIAGNOSIS — E78.2 MIXED HYPERLIPIDEMIA: ICD-10-CM

## 2025-08-19 DIAGNOSIS — J30.1 SEASONAL ALLERGIC RHINITIS DUE TO POLLEN: ICD-10-CM

## 2025-08-21 RX ORDER — ROSUVASTATIN CALCIUM 10 MG/1
10 TABLET, COATED ORAL NIGHTLY
Qty: 30 TABLET | Refills: 3 | Status: SHIPPED | OUTPATIENT
Start: 2025-08-21

## 2025-08-21 RX ORDER — FLUTICASONE PROPIONATE 50 MCG
2 SPRAY, SUSPENSION (ML) NASAL DAILY
Qty: 16 G | Refills: 0 | Status: SHIPPED | OUTPATIENT
Start: 2025-08-21 | End: 2025-09-04

## 2025-08-23 ENCOUNTER — ANESTHESIA EVENT (OUTPATIENT)
Dept: ENDOSCOPY | Age: 55
End: 2025-08-23
Payer: MEDICAID

## 2025-08-25 ENCOUNTER — HOSPITAL ENCOUNTER (OUTPATIENT)
Age: 55
Setting detail: OUTPATIENT SURGERY
Discharge: HOME | End: 2025-08-25
Attending: SPECIALIST | Admitting: SPECIALIST
Payer: MEDICAID

## 2025-08-25 ENCOUNTER — ANESTHESIA (OUTPATIENT)
Dept: ENDOSCOPY | Age: 55
End: 2025-08-25
Payer: MEDICAID

## 2025-08-25 VITALS
BODY MASS INDEX: 27.09 KG/M2 | RESPIRATION RATE: 18 BRPM | WEIGHT: 200 LBS | HEIGHT: 72 IN | TEMPERATURE: 97.1 F | OXYGEN SATURATION: 98 % | SYSTOLIC BLOOD PRESSURE: 114 MMHG | DIASTOLIC BLOOD PRESSURE: 76 MMHG | HEART RATE: 63 BPM

## 2025-08-25 DIAGNOSIS — Z12.11 COLON CANCER SCREENING: ICD-10-CM

## 2025-08-25 PROCEDURE — 2580000003 HC RX 258

## 2025-08-25 PROCEDURE — 3700000000 HC ANESTHESIA ATTENDED CARE: Performed by: SPECIALIST

## 2025-08-25 PROCEDURE — 6360000002 HC RX W HCPCS

## 2025-08-25 PROCEDURE — 45385 COLONOSCOPY W/LESION REMOVAL: CPT | Performed by: SPECIALIST

## 2025-08-25 PROCEDURE — 3609027000 HC COLONOSCOPY: Performed by: SPECIALIST

## 2025-08-25 PROCEDURE — 2709999900 HC NON-CHARGEABLE SUPPLY: Performed by: SPECIALIST

## 2025-08-25 PROCEDURE — 88305 TISSUE EXAM BY PATHOLOGIST: CPT

## 2025-08-25 PROCEDURE — 2500000003 HC RX 250 WO HCPCS: Performed by: SPECIALIST

## 2025-08-25 PROCEDURE — 7100000011 HC PHASE II RECOVERY - ADDTL 15 MIN: Performed by: SPECIALIST

## 2025-08-25 PROCEDURE — 3700000001 HC ADD 15 MINUTES (ANESTHESIA): Performed by: SPECIALIST

## 2025-08-25 PROCEDURE — 7100000010 HC PHASE II RECOVERY - FIRST 15 MIN: Performed by: SPECIALIST

## 2025-08-25 RX ORDER — SODIUM CHLORIDE 0.9 % (FLUSH) 0.9 %
5-40 SYRINGE (ML) INJECTION PRN
Status: DISCONTINUED | OUTPATIENT
Start: 2025-08-25 | End: 2025-08-25 | Stop reason: HOSPADM

## 2025-08-25 RX ORDER — LIDOCAINE HYDROCHLORIDE 20 MG/ML
INJECTION, SOLUTION INFILTRATION; PERINEURAL
Status: DISCONTINUED | OUTPATIENT
Start: 2025-08-25 | End: 2025-08-25 | Stop reason: SDUPTHER

## 2025-08-25 RX ORDER — PROPOFOL 10 MG/ML
INJECTION, EMULSION INTRAVENOUS
Status: DISCONTINUED | OUTPATIENT
Start: 2025-08-25 | End: 2025-08-25 | Stop reason: SDUPTHER

## 2025-08-25 RX ORDER — SODIUM CHLORIDE 9 MG/ML
INJECTION, SOLUTION INTRAVENOUS PRN
Status: DISCONTINUED | OUTPATIENT
Start: 2025-08-25 | End: 2025-08-25 | Stop reason: HOSPADM

## 2025-08-25 RX ORDER — SODIUM CHLORIDE 9 MG/ML
INJECTION, SOLUTION INTRAVENOUS
Status: COMPLETED
Start: 2025-08-25 | End: 2025-08-25

## 2025-08-25 RX ORDER — SODIUM CHLORIDE 0.9 % (FLUSH) 0.9 %
5-40 SYRINGE (ML) INJECTION EVERY 12 HOURS SCHEDULED
Status: DISCONTINUED | OUTPATIENT
Start: 2025-08-25 | End: 2025-08-25 | Stop reason: HOSPADM

## 2025-08-25 RX ORDER — SODIUM CHLORIDE 9 MG/ML
INJECTION, SOLUTION INTRAVENOUS CONTINUOUS
Status: DISCONTINUED | OUTPATIENT
Start: 2025-08-25 | End: 2025-08-25 | Stop reason: HOSPADM

## 2025-08-25 RX ADMIN — PROPOFOL 50 MG: 10 INJECTION, EMULSION INTRAVENOUS at 11:30

## 2025-08-25 RX ADMIN — PROPOFOL 20 MG: 10 INJECTION, EMULSION INTRAVENOUS at 11:43

## 2025-08-25 RX ADMIN — SODIUM CHLORIDE: 9 INJECTION, SOLUTION INTRAVENOUS at 10:54

## 2025-08-25 RX ADMIN — PROPOFOL 50 MG: 10 INJECTION, EMULSION INTRAVENOUS at 11:36

## 2025-08-25 RX ADMIN — PROPOFOL 50 MG: 10 INJECTION, EMULSION INTRAVENOUS at 11:33

## 2025-08-25 RX ADMIN — LIDOCAINE HYDROCHLORIDE 3 ML: 20 INJECTION, SOLUTION INFILTRATION; PERINEURAL at 11:24

## 2025-08-25 RX ADMIN — PROPOFOL 120 MG: 10 INJECTION, EMULSION INTRAVENOUS at 11:24

## 2025-08-25 RX ADMIN — PROPOFOL 50 MG: 10 INJECTION, EMULSION INTRAVENOUS at 11:40

## 2025-08-25 RX ADMIN — SODIUM CHLORIDE: 0.9 INJECTION, SOLUTION INTRAVENOUS at 10:54

## 2025-08-25 RX ADMIN — PROPOFOL 30 MG: 10 INJECTION, EMULSION INTRAVENOUS at 11:26

## 2025-08-25 ASSESSMENT — PAIN - FUNCTIONAL ASSESSMENT
PAIN_FUNCTIONAL_ASSESSMENT: 0-10
PAIN_FUNCTIONAL_ASSESSMENT: 0-10

## 2025-08-25 ASSESSMENT — PAIN SCALES - GENERAL: PAINLEVEL_OUTOF10: 0

## 2025-08-27 ENCOUNTER — HOSPITAL ENCOUNTER (OUTPATIENT)
Dept: PHYSICAL THERAPY | Age: 55
Setting detail: THERAPIES SERIES
Discharge: HOME | End: 2025-08-27
Payer: MEDICAID

## (undated) DEVICE — TRAP POLYP BALEEN

## (undated) DEVICE — TUBING SCTION CONN 1/4X10 RIB

## (undated) DEVICE — SNARE ENDOSCP POLYP 2.4 MM 240 CM 10 MM 2.8 MM CAPTIVATOR

## (undated) DEVICE — BRUSH ENDO CLN L90.5IN SHTH DIA1.7MM BRIST DIA5-7MM 2-6MM

## (undated) DEVICE — MANIFOLD SUCT SMK EVAC SGL PRT DISP NEPTUNE 2

## (undated) DEVICE — TUBE SET 96 MM 64 MM H2O PERISTALTIC STD AUX CHANNEL

## (undated) DEVICE — TUBING IRRIGATION 140/160/180/190 SER GI ENDOSCP SMARTCAP

## (undated) DEVICE — SURGICAL PROCEDURE KIT ENDOSCP CUST 883 CARRY-ON